# Patient Record
Sex: MALE | Race: WHITE | NOT HISPANIC OR LATINO
[De-identification: names, ages, dates, MRNs, and addresses within clinical notes are randomized per-mention and may not be internally consistent; named-entity substitution may affect disease eponyms.]

---

## 2018-04-06 ENCOUNTER — LABORATORY RESULT (OUTPATIENT)
Age: 53
End: 2018-04-06

## 2018-04-06 ENCOUNTER — TRANSCRIPTION ENCOUNTER (OUTPATIENT)
Age: 53
End: 2018-04-06

## 2018-04-06 ENCOUNTER — APPOINTMENT (OUTPATIENT)
Dept: INFECTIOUS DISEASE | Facility: CLINIC | Age: 53
End: 2018-04-06
Payer: COMMERCIAL

## 2018-04-06 VITALS
SYSTOLIC BLOOD PRESSURE: 171 MMHG | BODY MASS INDEX: 36.82 KG/M2 | TEMPERATURE: 99.3 F | WEIGHT: 263 LBS | HEART RATE: 114 BPM | DIASTOLIC BLOOD PRESSURE: 101 MMHG | HEIGHT: 71 IN | OXYGEN SATURATION: 97 %

## 2018-04-06 PROCEDURE — 99214 OFFICE O/P EST MOD 30 MIN: CPT

## 2018-04-10 LAB
ALBUMIN SERPL ELPH-MCNC: 4.3 G/DL
ALP BLD-CCNC: 54 U/L
ALT SERPL-CCNC: 44 U/L
ANION GAP SERPL CALC-SCNC: 17 MMOL/L
APPEARANCE: CLEAR
AST SERPL-CCNC: 43 U/L
BASOPHILS # BLD AUTO: 0.02 K/UL
BASOPHILS NFR BLD AUTO: 0.1 %
BILIRUB SERPL-MCNC: 0.8 MG/DL
BILIRUBIN URINE: NEGATIVE
BLOOD URINE: ABNORMAL
BUN SERPL-MCNC: 13 MG/DL
C TRACH RRNA SPEC QL NAA+PROBE: NOT DETECTED
CALCIUM SERPL-MCNC: 9.9 MG/DL
CHLORIDE SERPL-SCNC: 101 MMOL/L
CO2 SERPL-SCNC: 24 MMOL/L
COLOR: YELLOW
CREAT SERPL-MCNC: 1.26 MG/DL
CYSTATIN C SERPL-MCNC: 1.04 MG/L
EOSINOPHIL # BLD AUTO: 0.04 K/UL
EOSINOPHIL NFR BLD AUTO: 0.3 %
GFR/BSA.PRED SERPLBLD CYS-BASED-ARV: 77
GLUCOSE QUALITATIVE U: NEGATIVE MG/DL
GLUCOSE SERPL-MCNC: 88 MG/DL
HCT VFR BLD CALC: 54.2 %
HGB BLD-MCNC: 18.1 G/DL
HIV1 RNA # SERPL NAA+PROBE: NORMAL
HIV1 RNA # SERPL NAA+PROBE: NORMAL COPIES/ML
HIV1+2 AB SPEC QL IA.RAPID: NONREACTIVE
IMM GRANULOCYTES NFR BLD AUTO: 0.4 %
KETONES URINE: NEGATIVE
LEUKOCYTE ESTERASE URINE: NEGATIVE
LYMPHOCYTES # BLD AUTO: 1.85 K/UL
LYMPHOCYTES NFR BLD AUTO: 13.7 %
MAN DIFF?: NORMAL
MCHC RBC-ENTMCNC: 30.3 PG
MCHC RBC-ENTMCNC: 33.4 GM/DL
MCV RBC AUTO: 90.6 FL
MONOCYTES # BLD AUTO: 0.86 K/UL
MONOCYTES NFR BLD AUTO: 6.4 %
N GONORRHOEA RRNA SPEC QL NAA+PROBE: NOT DETECTED
NEUTROPHILS # BLD AUTO: 10.7 K/UL
NEUTROPHILS NFR BLD AUTO: 79.1 %
NITRITE URINE: NEGATIVE
PH URINE: 7.5
PLATELET # BLD AUTO: 282 K/UL
POTASSIUM SERPL-SCNC: 5 MMOL/L
PROT SERPL-MCNC: 7.7 G/DL
PROTEIN URINE: 100 MG/DL
RBC # BLD: 5.98 M/UL
RBC # FLD: 14.4 %
SODIUM SERPL-SCNC: 142 MMOL/L
SOURCE AMPLIFICATION: NORMAL
SOURCE ANAL: NORMAL
SOURCE ORAL: NORMAL
SPECIFIC GRAVITY URINE: 1.02
T PALLIDUM AB SER QL IA: NEGATIVE
UROBILINOGEN URINE: 1 MG/DL
VIRAL LOAD INTERP: NORMAL
VIRAL LOAD LOG: NORMAL LG COP/ML
WBC # FLD AUTO: 13.53 K/UL

## 2018-05-22 ENCOUNTER — APPOINTMENT (OUTPATIENT)
Dept: INFECTIOUS DISEASE | Facility: CLINIC | Age: 53
End: 2018-05-22
Payer: COMMERCIAL

## 2018-05-22 VITALS
TEMPERATURE: 97.9 F | HEART RATE: 103 BPM | SYSTOLIC BLOOD PRESSURE: 220 MMHG | OXYGEN SATURATION: 94 % | RESPIRATION RATE: 16 BRPM | BODY MASS INDEX: 36.12 KG/M2 | WEIGHT: 258 LBS | DIASTOLIC BLOOD PRESSURE: 119 MMHG | HEIGHT: 71 IN

## 2018-05-22 VITALS — SYSTOLIC BLOOD PRESSURE: 163 MMHG | DIASTOLIC BLOOD PRESSURE: 107 MMHG

## 2018-05-22 PROCEDURE — 99214 OFFICE O/P EST MOD 30 MIN: CPT

## 2018-05-23 LAB
ALBUMIN SERPL ELPH-MCNC: 4.3 G/DL
ALP BLD-CCNC: 55 U/L
ALT SERPL-CCNC: 44 U/L
ANION GAP SERPL CALC-SCNC: 12 MMOL/L
APPEARANCE: CLEAR
AST SERPL-CCNC: 42 U/L
BACTERIA: NEGATIVE
BASOPHILS # BLD AUTO: 0.03 K/UL
BASOPHILS NFR BLD AUTO: 0.3 %
BILIRUB SERPL-MCNC: 0.6 MG/DL
BILIRUBIN URINE: NEGATIVE
BLOOD URINE: ABNORMAL
BUN SERPL-MCNC: 14 MG/DL
C TRACH RRNA SPEC QL NAA+PROBE: NOT DETECTED
C TRACH RRNA SPEC QL NAA+PROBE: NOT DETECTED
CALCIUM OXALATE CRYSTALS: ABNORMAL
CALCIUM SERPL-MCNC: 9.3 MG/DL
CHLORIDE SERPL-SCNC: 101 MMOL/L
CO2 SERPL-SCNC: 27 MMOL/L
COLOR: ABNORMAL
CREAT SERPL-MCNC: 1.28 MG/DL
EOSINOPHIL # BLD AUTO: 0.11 K/UL
EOSINOPHIL NFR BLD AUTO: 1.1 %
GLUCOSE QUALITATIVE U: NEGATIVE MG/DL
GLUCOSE SERPL-MCNC: 88 MG/DL
HCT VFR BLD CALC: 56.7 %
HGB BLD-MCNC: 18.5 G/DL
HIV1+2 AB SPEC QL IA.RAPID: NONREACTIVE
HYALINE CASTS: 1 /LPF
IMM GRANULOCYTES NFR BLD AUTO: 0.5 %
KETONES URINE: NEGATIVE
LEUKOCYTE ESTERASE URINE: NEGATIVE
LYMPHOCYTES # BLD AUTO: 2.05 K/UL
LYMPHOCYTES NFR BLD AUTO: 20.5 %
MAN DIFF?: NORMAL
MCHC RBC-ENTMCNC: 30.6 PG
MCHC RBC-ENTMCNC: 32.6 GM/DL
MCV RBC AUTO: 93.9 FL
MICROSCOPIC-UA: NORMAL
MONOCYTES # BLD AUTO: 0.79 K/UL
MONOCYTES NFR BLD AUTO: 7.9 %
N GONORRHOEA RRNA SPEC QL NAA+PROBE: NOT DETECTED
N GONORRHOEA RRNA SPEC QL NAA+PROBE: NOT DETECTED
NEUTROPHILS # BLD AUTO: 6.99 K/UL
NEUTROPHILS NFR BLD AUTO: 69.7 %
NITRITE URINE: NEGATIVE
PH URINE: 7.5
PLATELET # BLD AUTO: 313 K/UL
POTASSIUM SERPL-SCNC: 4.4 MMOL/L
PROT SERPL-MCNC: 7.7 G/DL
PROTEIN URINE: 100 MG/DL
RBC # BLD: 6.04 M/UL
RBC # FLD: 15 %
RED BLOOD CELLS URINE: 80 /HPF
SODIUM SERPL-SCNC: 140 MMOL/L
SOURCE AMPLIFICATION: NORMAL
SOURCE AMPLIFICATION: NORMAL
SPECIFIC GRAVITY URINE: 1.03
SQUAMOUS EPITHELIAL CELLS: 1 /HPF
T PALLIDUM AB SER QL IA: NEGATIVE
TSH SERPL-ACNC: 1.71 UIU/ML
UROBILINOGEN URINE: 1 MG/DL
WBC # FLD AUTO: 10.02 K/UL
WHITE BLOOD CELLS URINE: 3 /HPF

## 2018-06-15 ENCOUNTER — APPOINTMENT (OUTPATIENT)
Dept: CARDIOLOGY | Facility: CLINIC | Age: 53
End: 2018-06-15
Payer: COMMERCIAL

## 2018-06-15 ENCOUNTER — NON-APPOINTMENT (OUTPATIENT)
Age: 53
End: 2018-06-15

## 2018-06-15 VITALS
WEIGHT: 247 LBS | OXYGEN SATURATION: 95 % | BODY MASS INDEX: 34.58 KG/M2 | DIASTOLIC BLOOD PRESSURE: 88 MMHG | HEART RATE: 100 BPM | SYSTOLIC BLOOD PRESSURE: 154 MMHG | HEIGHT: 71 IN

## 2018-06-15 PROCEDURE — 93000 ELECTROCARDIOGRAM COMPLETE: CPT

## 2018-06-15 PROCEDURE — 99244 OFF/OP CNSLTJ NEW/EST MOD 40: CPT

## 2018-06-15 RX ORDER — HYDROCHLOROTHIAZIDE 12.5 MG/1
12.5 CAPSULE ORAL
Qty: 30 | Refills: 0 | Status: DISCONTINUED | COMMUNITY
Start: 2018-05-22 | End: 2018-06-15

## 2018-06-18 ENCOUNTER — EMERGENCY (EMERGENCY)
Facility: HOSPITAL | Age: 53
LOS: 1 days | Discharge: HOME | End: 2018-06-18
Attending: EMERGENCY MEDICINE | Admitting: EMERGENCY MEDICINE
Payer: COMMERCIAL

## 2018-06-18 VITALS
OXYGEN SATURATION: 97 % | DIASTOLIC BLOOD PRESSURE: 91 MMHG | RESPIRATION RATE: 16 BRPM | TEMPERATURE: 99 F | HEART RATE: 86 BPM | SYSTOLIC BLOOD PRESSURE: 144 MMHG | HEIGHT: 71 IN | WEIGHT: 250 LBS

## 2018-06-18 LAB — S PYO AG SPEC QL IA: NEGATIVE — SIGNIFICANT CHANGE UP

## 2018-06-18 PROCEDURE — 99284 EMERGENCY DEPT VISIT MOD MDM: CPT

## 2018-06-18 PROCEDURE — 87081 CULTURE SCREEN ONLY: CPT

## 2018-06-18 PROCEDURE — 99283 EMERGENCY DEPT VISIT LOW MDM: CPT

## 2018-06-18 PROCEDURE — 87880 STREP A ASSAY W/OPTIC: CPT

## 2018-06-18 RX ORDER — BENZOCAINE AND MENTHOL 5; 1 G/100ML; G/100ML
1 LIQUID ORAL ONCE
Qty: 0 | Refills: 0 | Status: COMPLETED | OUTPATIENT
Start: 2018-06-18 | End: 2018-06-18

## 2018-06-18 RX ADMIN — Medication 60 MILLIGRAM(S): at 21:31

## 2018-06-18 RX ADMIN — BENZOCAINE AND MENTHOL 1 LOZENGE: 5; 1 LIQUID ORAL at 21:31

## 2018-06-18 NOTE — ED PROVIDER NOTE - ATTENDING CONTRIBUTION TO CARE
hx as per pt and PA, 5yo male with sore throat for 2 weeks, no fever, drooling or change in voice  exam :+exudate, uvula midline, no trismus  plan:strep test, po abx and steroids, ent follow up  agree with assessment and plan of PA

## 2018-06-18 NOTE — ED PROVIDER NOTE - OBJECTIVE STATEMENT
53 y male presents with throat irritation, 53 y male presents with throat irritation  x 2 weeks,  no nv, denies fever or chills,  no sick contacts, denies cough, states he has had some nasal congestion, at first used sudafed but did not like the way the medication made him feel, started to use otc saline nasal spray, that help with the nasal congestion.  states he has been gargling with hydrogen peroxide.  PMD Dr Zimmer 53 y male presents with throat irritation  x 2 weeks,  no nv, denies fever or chills,  no sick contacts, denies cough, states he has had some nasal congestion, at first used sudafed but did not like the way the medication made him feel, started to use otc saline nasal spray, that help with the nasal congestion.  states he has been gargling with hydrogen peroxide. patient states he takes truvada prophylactically, does not have hiv.    PMD Dr Zimmer

## 2018-06-18 NOTE — ED PROVIDER NOTE - PROGRESS NOTE DETAILS
rx for augmentin and prednisone sent to pharmacy, given information for Dr swan, strep test negative.  advised if condition worsens return to ed

## 2018-06-21 LAB
CULTURE RESULTS: SIGNIFICANT CHANGE UP
SPECIMEN SOURCE: SIGNIFICANT CHANGE UP

## 2018-06-26 ENCOUNTER — NON-APPOINTMENT (OUTPATIENT)
Age: 53
End: 2018-06-26

## 2018-06-26 ENCOUNTER — APPOINTMENT (OUTPATIENT)
Dept: CARDIOLOGY | Facility: CLINIC | Age: 53
End: 2018-06-26
Payer: COMMERCIAL

## 2018-06-26 VITALS
WEIGHT: 246 LBS | HEIGHT: 71 IN | DIASTOLIC BLOOD PRESSURE: 93 MMHG | BODY MASS INDEX: 34.44 KG/M2 | OXYGEN SATURATION: 95 % | SYSTOLIC BLOOD PRESSURE: 160 MMHG | HEART RATE: 87 BPM

## 2018-06-26 VITALS — DIASTOLIC BLOOD PRESSURE: 92 MMHG | SYSTOLIC BLOOD PRESSURE: 140 MMHG

## 2018-06-26 PROCEDURE — 99214 OFFICE O/P EST MOD 30 MIN: CPT

## 2018-06-26 PROCEDURE — 93000 ELECTROCARDIOGRAM COMPLETE: CPT

## 2018-07-06 ENCOUNTER — MEDICATION RENEWAL (OUTPATIENT)
Age: 53
End: 2018-07-06

## 2018-07-10 ENCOUNTER — MEDICATION RENEWAL (OUTPATIENT)
Age: 53
End: 2018-07-10

## 2018-07-10 ENCOUNTER — RX RENEWAL (OUTPATIENT)
Age: 53
End: 2018-07-10

## 2018-07-16 ENCOUNTER — APPOINTMENT (OUTPATIENT)
Dept: CARDIOLOGY | Facility: CLINIC | Age: 53
End: 2018-07-16
Payer: COMMERCIAL

## 2018-07-16 PROCEDURE — 93306 TTE W/DOPPLER COMPLETE: CPT

## 2018-07-18 PROBLEM — I10 ESSENTIAL (PRIMARY) HYPERTENSION: Chronic | Status: ACTIVE | Noted: 2018-06-18

## 2018-07-19 ENCOUNTER — APPOINTMENT (OUTPATIENT)
Dept: CARDIOLOGY | Facility: CLINIC | Age: 53
End: 2018-07-19
Payer: COMMERCIAL

## 2018-07-19 ENCOUNTER — LABORATORY RESULT (OUTPATIENT)
Age: 53
End: 2018-07-19

## 2018-07-19 VITALS
WEIGHT: 243 LBS | TEMPERATURE: 98.1 F | HEART RATE: 74 BPM | OXYGEN SATURATION: 98 % | RESPIRATION RATE: 18 BRPM | BODY MASS INDEX: 33.89 KG/M2 | SYSTOLIC BLOOD PRESSURE: 136 MMHG | DIASTOLIC BLOOD PRESSURE: 85 MMHG

## 2018-07-19 DIAGNOSIS — R06.09 OTHER FORMS OF DYSPNEA: ICD-10-CM

## 2018-07-19 PROCEDURE — 99215 OFFICE O/P EST HI 40 MIN: CPT

## 2018-07-19 PROCEDURE — 93000 ELECTROCARDIOGRAM COMPLETE: CPT

## 2018-07-20 LAB
BASOPHILS # BLD AUTO: 0.02 K/UL
BASOPHILS NFR BLD AUTO: 0.3 %
CHOLEST SERPL-MCNC: 151 MG/DL
CHOLEST/HDLC SERPL: 3.6 RATIO
EOSINOPHIL # BLD AUTO: 0.18 K/UL
EOSINOPHIL NFR BLD AUTO: 2.8 %
HCT VFR BLD CALC: 46.3 %
HDLC SERPL-MCNC: 42 MG/DL
HGB BLD-MCNC: 15.8 G/DL
IMM GRANULOCYTES NFR BLD AUTO: 0.2 %
LDLC SERPL CALC-MCNC: 84 MG/DL
LYMPHOCYTES # BLD AUTO: 1.42 K/UL
LYMPHOCYTES NFR BLD AUTO: 22 %
MAN DIFF?: NORMAL
MCHC RBC-ENTMCNC: 31 PG
MCHC RBC-ENTMCNC: 34.1 GM/DL
MCV RBC AUTO: 90.8 FL
MONOCYTES # BLD AUTO: 0.65 K/UL
MONOCYTES NFR BLD AUTO: 10.1 %
NEUTROPHILS # BLD AUTO: 4.18 K/UL
NEUTROPHILS NFR BLD AUTO: 64.6 %
PLATELET # BLD AUTO: 218 K/UL
RBC # BLD: 5.1 M/UL
RBC # FLD: 14.5 %
TRIGL SERPL-MCNC: 125 MG/DL
WBC # FLD AUTO: 6.46 K/UL

## 2018-08-09 ENCOUNTER — OUTPATIENT (OUTPATIENT)
Dept: OUTPATIENT SERVICES | Facility: HOSPITAL | Age: 53
LOS: 1 days | End: 2018-08-09
Payer: COMMERCIAL

## 2018-08-09 VITALS
OXYGEN SATURATION: 96 % | HEART RATE: 90 BPM | RESPIRATION RATE: 18 BRPM | SYSTOLIC BLOOD PRESSURE: 111 MMHG | DIASTOLIC BLOOD PRESSURE: 59 MMHG | WEIGHT: 250 LBS | HEIGHT: 71 IN | TEMPERATURE: 99 F

## 2018-08-09 DIAGNOSIS — R07.9 CHEST PAIN, UNSPECIFIED: ICD-10-CM

## 2018-08-09 LAB
ALBUMIN SERPL ELPH-MCNC: 4.5 G/DL — SIGNIFICANT CHANGE UP (ref 3.3–5)
ALP SERPL-CCNC: 61 U/L — SIGNIFICANT CHANGE UP (ref 40–120)
ALT FLD-CCNC: 65 U/L — HIGH (ref 10–45)
ANION GAP SERPL CALC-SCNC: 13 MMOL/L — SIGNIFICANT CHANGE UP (ref 5–17)
AST SERPL-CCNC: 37 U/L — SIGNIFICANT CHANGE UP (ref 10–40)
BILIRUB SERPL-MCNC: 0.8 MG/DL — SIGNIFICANT CHANGE UP (ref 0.2–1.2)
BUN SERPL-MCNC: 10 MG/DL — SIGNIFICANT CHANGE UP (ref 7–23)
CALCIUM SERPL-MCNC: 9.1 MG/DL — SIGNIFICANT CHANGE UP (ref 8.4–10.5)
CHLORIDE SERPL-SCNC: 98 MMOL/L — SIGNIFICANT CHANGE UP (ref 96–108)
CO2 SERPL-SCNC: 26 MMOL/L — SIGNIFICANT CHANGE UP (ref 22–31)
CREAT SERPL-MCNC: 1.06 MG/DL — SIGNIFICANT CHANGE UP (ref 0.5–1.3)
GLUCOSE SERPL-MCNC: 114 MG/DL — HIGH (ref 70–99)
HCT VFR BLD CALC: 44.1 % — SIGNIFICANT CHANGE UP (ref 39–50)
HGB BLD-MCNC: 15.9 G/DL — SIGNIFICANT CHANGE UP (ref 13–17)
MCHC RBC-ENTMCNC: 32.1 PG — SIGNIFICANT CHANGE UP (ref 27–34)
MCHC RBC-ENTMCNC: 36 GM/DL — SIGNIFICANT CHANGE UP (ref 32–36)
MCV RBC AUTO: 89.2 FL — SIGNIFICANT CHANGE UP (ref 80–100)
PLATELET # BLD AUTO: 216 K/UL — SIGNIFICANT CHANGE UP (ref 150–400)
POTASSIUM SERPL-MCNC: 3.9 MMOL/L — SIGNIFICANT CHANGE UP (ref 3.5–5.3)
POTASSIUM SERPL-SCNC: 3.9 MMOL/L — SIGNIFICANT CHANGE UP (ref 3.5–5.3)
PROT SERPL-MCNC: 7.4 G/DL — SIGNIFICANT CHANGE UP (ref 6–8.3)
RBC # BLD: 4.95 M/UL — SIGNIFICANT CHANGE UP (ref 4.2–5.8)
RBC # FLD: 14.1 % — SIGNIFICANT CHANGE UP (ref 10.3–14.5)
SODIUM SERPL-SCNC: 137 MMOL/L — SIGNIFICANT CHANGE UP (ref 135–145)
TROPONIN T, HIGH SENSITIVITY RESULT: 12 NG/L — SIGNIFICANT CHANGE UP (ref 0–51)
WBC # BLD: 6.2 K/UL — SIGNIFICANT CHANGE UP (ref 3.8–10.5)
WBC # FLD AUTO: 6.2 K/UL — SIGNIFICANT CHANGE UP (ref 3.8–10.5)

## 2018-08-09 PROCEDURE — 93010 ELECTROCARDIOGRAM REPORT: CPT

## 2018-08-09 PROCEDURE — 85027 COMPLETE CBC AUTOMATED: CPT

## 2018-08-09 PROCEDURE — C1769: CPT

## 2018-08-09 PROCEDURE — 93005 ELECTROCARDIOGRAM TRACING: CPT

## 2018-08-09 PROCEDURE — 99152 MOD SED SAME PHYS/QHP 5/>YRS: CPT

## 2018-08-09 PROCEDURE — 93458 L HRT ARTERY/VENTRICLE ANGIO: CPT | Mod: 26

## 2018-08-09 PROCEDURE — 80053 COMPREHEN METABOLIC PANEL: CPT

## 2018-08-09 PROCEDURE — 84484 ASSAY OF TROPONIN QUANT: CPT

## 2018-08-09 PROCEDURE — C1887: CPT

## 2018-08-09 PROCEDURE — 93458 L HRT ARTERY/VENTRICLE ANGIO: CPT

## 2018-08-09 PROCEDURE — C1894: CPT

## 2018-08-09 RX ORDER — AMLODIPINE BESYLATE 2.5 MG/1
1 TABLET ORAL
Qty: 0 | Refills: 0 | COMMUNITY

## 2018-08-09 RX ORDER — ATORVASTATIN CALCIUM 80 MG/1
1 TABLET, FILM COATED ORAL
Qty: 0 | Refills: 0 | COMMUNITY

## 2018-08-09 RX ORDER — EMTRICITABINE AND TENOFOVIR DISOPROXIL FUMARATE 200; 300 MG/1; MG/1
1 TABLET, FILM COATED ORAL
Qty: 0 | Refills: 0 | COMMUNITY

## 2018-08-09 RX ORDER — ASPIRIN/CALCIUM CARB/MAGNESIUM 324 MG
1 TABLET ORAL
Qty: 0 | Refills: 0 | COMMUNITY

## 2018-08-09 NOTE — H&P CARDIOLOGY - HISTORY OF PRESENT ILLNESS
This is a 53 year old man with PMHx of HTN, on Truvada for prophylaxis, as he is bisexual. He has been using steroids and human growth hormone for two years, but has since stopped. He at times gets short of breath with exertion. Pt underwent echocardiogram, and this showed moderate segmental LV dysfunction with an EF of 40% and inferior, apical, and lateral hypokinesis. Pt denies chest pain, dizziness, PND, orthopnea, LE swelling, or syncope. This is a 53 year old man with PMHx of HTN, on Truvada for prophylaxis, as he is bisexual. He has been using steroids and human growth hormone for two years, but has since stopped. He at times gets short of breath with exertion. Pt underwent echocardiogram in 7/2018 and this showed moderate segmental LV dysfunction with an EF of 40% and inferior, apical, and lateral hypokinesis. Pt denies chest pain, dizziness, PND, orthopnea, LE swelling, or syncope. This is a 53 year old man with PMHx of HTN, on Truvada for prophylaxis, as he is bisexual. He has been using steroids and human growth hormone for two years, but has since stopped. He at times gets short of breath with exertion. Pt underwent echocardiogram in 7/2018 and this showed moderate segmental LV dysfunction with an EF of 40% and inferior, apical, and lateral hypokinesis. Pt denies chest pain, dizziness, PND, orthopnea, LE swelling, or syncope.   Pt was referred by Dr Redmond for Cardiac Cath.

## 2018-08-16 ENCOUNTER — APPOINTMENT (OUTPATIENT)
Dept: CARDIOLOGY | Facility: CLINIC | Age: 53
End: 2018-08-16
Payer: COMMERCIAL

## 2018-08-16 PROCEDURE — 93224 XTRNL ECG REC UP TO 48 HRS: CPT

## 2018-08-21 ENCOUNTER — NON-APPOINTMENT (OUTPATIENT)
Age: 53
End: 2018-08-21

## 2018-08-21 ENCOUNTER — APPOINTMENT (OUTPATIENT)
Dept: CARDIOLOGY | Facility: CLINIC | Age: 53
End: 2018-08-21
Payer: COMMERCIAL

## 2018-08-21 ENCOUNTER — MED ADMIN CHARGE (OUTPATIENT)
Age: 53
End: 2018-08-21

## 2018-08-21 VITALS
DIASTOLIC BLOOD PRESSURE: 80 MMHG | BODY MASS INDEX: 35.7 KG/M2 | OXYGEN SATURATION: 95 % | HEIGHT: 71 IN | WEIGHT: 255 LBS | HEART RATE: 78 BPM | SYSTOLIC BLOOD PRESSURE: 159 MMHG

## 2018-08-21 PROCEDURE — 93000 ELECTROCARDIOGRAM COMPLETE: CPT

## 2018-08-21 PROCEDURE — 99214 OFFICE O/P EST MOD 30 MIN: CPT

## 2018-08-21 RX ORDER — HYDROCHLOROTHIAZIDE 25 MG/1
25 TABLET ORAL DAILY
Qty: 30 | Refills: 1 | Status: DISCONTINUED | COMMUNITY
Start: 2018-06-15 | End: 2018-08-21

## 2018-08-21 RX ORDER — ASPIRIN 81 MG/1
81 TABLET ORAL
Qty: 30 | Refills: 3 | Status: DISCONTINUED | COMMUNITY
Start: 2018-06-15 | End: 2018-08-21

## 2018-08-21 RX ORDER — ATORVASTATIN CALCIUM 20 MG/1
20 TABLET, FILM COATED ORAL
Qty: 30 | Refills: 1 | Status: DISCONTINUED | COMMUNITY
Start: 2018-07-19 | End: 2018-08-21

## 2018-09-05 ENCOUNTER — APPOINTMENT (OUTPATIENT)
Dept: CARDIOLOGY | Facility: CLINIC | Age: 53
End: 2018-09-05
Payer: COMMERCIAL

## 2018-09-05 VITALS
OXYGEN SATURATION: 99 % | SYSTOLIC BLOOD PRESSURE: 161 MMHG | BODY MASS INDEX: 35.14 KG/M2 | HEIGHT: 71 IN | HEART RATE: 90 BPM | DIASTOLIC BLOOD PRESSURE: 89 MMHG | WEIGHT: 251 LBS

## 2018-09-05 VITALS — DIASTOLIC BLOOD PRESSURE: 80 MMHG | SYSTOLIC BLOOD PRESSURE: 130 MMHG

## 2018-09-05 PROCEDURE — 99215 OFFICE O/P EST HI 40 MIN: CPT

## 2018-09-12 ENCOUNTER — RX RENEWAL (OUTPATIENT)
Age: 53
End: 2018-09-12

## 2018-10-01 ENCOUNTER — RX RENEWAL (OUTPATIENT)
Age: 53
End: 2018-10-01

## 2018-10-09 ENCOUNTER — APPOINTMENT (OUTPATIENT)
Dept: CARDIOLOGY | Facility: CLINIC | Age: 53
End: 2018-10-09
Payer: COMMERCIAL

## 2018-10-09 VITALS
OXYGEN SATURATION: 96 % | DIASTOLIC BLOOD PRESSURE: 79 MMHG | WEIGHT: 259 LBS | HEIGHT: 71 IN | BODY MASS INDEX: 36.26 KG/M2 | HEART RATE: 75 BPM | SYSTOLIC BLOOD PRESSURE: 131 MMHG

## 2018-10-09 PROCEDURE — 99214 OFFICE O/P EST MOD 30 MIN: CPT

## 2018-10-09 RX ORDER — METOPROLOL SUCCINATE 25 MG/1
25 TABLET, EXTENDED RELEASE ORAL
Qty: 90 | Refills: 3 | Status: DISCONTINUED | COMMUNITY
Start: 2018-09-12 | End: 2018-10-09

## 2018-10-09 RX ORDER — AMLODIPINE BESYLATE 10 MG/1
10 TABLET ORAL DAILY
Qty: 90 | Refills: 3 | Status: DISCONTINUED | COMMUNITY
Start: 2018-06-15 | End: 2018-10-09

## 2018-11-15 ENCOUNTER — RX RENEWAL (OUTPATIENT)
Age: 53
End: 2018-11-15

## 2018-11-16 ENCOUNTER — RX RENEWAL (OUTPATIENT)
Age: 53
End: 2018-11-16

## 2018-11-20 ENCOUNTER — NON-APPOINTMENT (OUTPATIENT)
Age: 53
End: 2018-11-20

## 2018-11-20 ENCOUNTER — APPOINTMENT (OUTPATIENT)
Dept: CARDIOLOGY | Facility: CLINIC | Age: 53
End: 2018-11-20
Payer: COMMERCIAL

## 2018-11-20 VITALS
SYSTOLIC BLOOD PRESSURE: 159 MMHG | HEART RATE: 71 BPM | BODY MASS INDEX: 35.56 KG/M2 | DIASTOLIC BLOOD PRESSURE: 83 MMHG | WEIGHT: 254 LBS | OXYGEN SATURATION: 95 % | HEIGHT: 71 IN

## 2018-11-20 PROCEDURE — 99214 OFFICE O/P EST MOD 30 MIN: CPT

## 2018-11-20 PROCEDURE — 93000 ELECTROCARDIOGRAM COMPLETE: CPT

## 2018-11-21 ENCOUNTER — APPOINTMENT (OUTPATIENT)
Dept: INFECTIOUS DISEASE | Facility: CLINIC | Age: 53
End: 2018-11-21

## 2018-11-27 ENCOUNTER — APPOINTMENT (OUTPATIENT)
Dept: INFECTIOUS DISEASE | Facility: CLINIC | Age: 53
End: 2018-11-27
Payer: COMMERCIAL

## 2018-11-27 VITALS
BODY MASS INDEX: 35.56 KG/M2 | OXYGEN SATURATION: 97 % | HEART RATE: 62 BPM | TEMPERATURE: 97.4 F | SYSTOLIC BLOOD PRESSURE: 151 MMHG | DIASTOLIC BLOOD PRESSURE: 76 MMHG | HEIGHT: 71 IN | WEIGHT: 254 LBS

## 2018-11-27 PROCEDURE — 99214 OFFICE O/P EST MOD 30 MIN: CPT

## 2018-11-27 RX ORDER — EMTRICITABINE AND TENOFOVIR DISOPROXIL FUMARATE 200; 300 MG/1; MG/1
200-300 TABLET, FILM COATED ORAL
Qty: 30 | Refills: 2 | Status: COMPLETED | COMMUNITY
Start: 2018-04-06 | End: 2018-11-27

## 2018-11-28 LAB
25(OH)D3 SERPL-MCNC: 44.8 NG/ML
APPEARANCE: CLEAR
BACTERIA: NEGATIVE
BILIRUBIN URINE: NEGATIVE
BLOOD URINE: NEGATIVE
C TRACH RRNA SPEC QL NAA+PROBE: NOT DETECTED
CD3 CELLS # BLD: 1109 /UL
CD3 CELLS NFR BLD: 69 %
CD3+CD4+ CELLS # BLD: 683 /UL
CD3+CD4+ CELLS NFR BLD: 42 %
CD3+CD4+ CELLS/CD3+CD8+ CLL SPEC: 2.1 RATIO
CD3+CD8+ CELLS # SPEC: 324 /UL
CD3+CD8+ CELLS NFR BLD: 20 %
CHOLEST SERPL-MCNC: 143 MG/DL
CHOLEST/HDLC SERPL: 4.9 RATIO
COLOR: YELLOW
GLUCOSE QUALITATIVE U: NEGATIVE MG/DL
HBA1C MFR BLD HPLC: 5.7 %
HDLC SERPL-MCNC: 29 MG/DL
HIV1 RNA # SERPL NAA+PROBE: NORMAL
HIV1 RNA # SERPL NAA+PROBE: NORMAL COPIES/ML
HIV1+2 AB SPEC QL IA.RAPID: NONREACTIVE
HYALINE CASTS: 0 /LPF
KETONES URINE: NEGATIVE
LDLC SERPL CALC-MCNC: 82 MG/DL
LEUKOCYTE ESTERASE URINE: NEGATIVE
MICROSCOPIC-UA: NORMAL
N GONORRHOEA RRNA SPEC QL NAA+PROBE: NOT DETECTED
NITRITE URINE: NEGATIVE
PH URINE: 6.5
PROTEIN URINE: 30 MG/DL
RED BLOOD CELLS URINE: 6 /HPF
SOURCE AMPLIFICATION: NORMAL
SOURCE ANAL: NORMAL
SOURCE ORAL: NORMAL
SPECIFIC GRAVITY URINE: 1.03
SQUAMOUS EPITHELIAL CELLS: 0 /HPF
T PALLIDUM AB SER QL IA: NEGATIVE
TRIGL SERPL-MCNC: 162 MG/DL
UROBILINOGEN URINE: NEGATIVE MG/DL
VIRAL LOAD INTERP: NORMAL
VIRAL LOAD LOG: NORMAL LG COP/ML
WHITE BLOOD CELLS URINE: 2 /HPF

## 2018-11-30 LAB
M TB IFN-G BLD-IMP: NEGATIVE
QUANTIFERON TB PLUS MITOGEN MINUS NIL: 6.91 IU/ML
QUANTIFERON TB PLUS NIL: 0.06 IU/ML
QUANTIFERON TB PLUS TB1 MINUS NIL: 0 IU/ML
QUANTIFERON TB PLUS TB2 MINUS NIL: -0.01 IU/ML

## 2018-12-13 ENCOUNTER — RX RENEWAL (OUTPATIENT)
Age: 53
End: 2018-12-13

## 2018-12-28 ENCOUNTER — APPOINTMENT (OUTPATIENT)
Dept: CARDIOLOGY | Facility: CLINIC | Age: 53
End: 2018-12-28
Payer: COMMERCIAL

## 2018-12-28 PROCEDURE — 93306 TTE W/DOPPLER COMPLETE: CPT

## 2019-02-28 ENCOUNTER — TRANSCRIPTION ENCOUNTER (OUTPATIENT)
Age: 54
End: 2019-02-28

## 2019-03-13 ENCOUNTER — RX RENEWAL (OUTPATIENT)
Age: 54
End: 2019-03-13

## 2019-03-20 ENCOUNTER — APPOINTMENT (OUTPATIENT)
Dept: INFECTIOUS DISEASE | Facility: CLINIC | Age: 54
End: 2019-03-20
Payer: COMMERCIAL

## 2019-03-20 VITALS
DIASTOLIC BLOOD PRESSURE: 81 MMHG | SYSTOLIC BLOOD PRESSURE: 158 MMHG | TEMPERATURE: 96.9 F | WEIGHT: 262 LBS | BODY MASS INDEX: 36.54 KG/M2 | OXYGEN SATURATION: 94 % | HEART RATE: 81 BPM

## 2019-03-20 LAB
APPEARANCE: CLEAR
BACTERIA: NEGATIVE
BILIRUBIN URINE: NEGATIVE
BLOOD URINE: ABNORMAL
COLOR: YELLOW
GLUCOSE QUALITATIVE U: NEGATIVE
HYALINE CASTS: 0 /LPF
KETONES URINE: NEGATIVE
LEUKOCYTE ESTERASE URINE: NEGATIVE
MICROSCOPIC-UA: NORMAL
NITRITE URINE: NEGATIVE
PH URINE: 6.5
PROTEIN URINE: ABNORMAL
RED BLOOD CELLS URINE: 10 /HPF
SPECIFIC GRAVITY URINE: 1.03
SQUAMOUS EPITHELIAL CELLS: 1 /HPF
UROBILINOGEN URINE: NORMAL
WHITE BLOOD CELLS URINE: 1 /HPF

## 2019-03-20 PROCEDURE — 99214 OFFICE O/P EST MOD 30 MIN: CPT

## 2019-03-20 NOTE — ASSESSMENT
[FreeTextEntry1] : 3/20/2019-----KAVIN HANKINS is a 53 year old male being seen for a follow-up visit. Pt doing great with Blood Pressure. Continue Truvada for PrEP. see in 3 months. Seeing cardiology. doing great. labs today.  [Treatment Education] : treatment education [Treatment Adherence] : treatment adherence [Drug Interactions / Side Effects] : drug interactions/side effects [Anticipatory Guidance] : anticipatory guidance

## 2019-03-20 NOTE — HISTORY OF PRESENT ILLNESS
[FreeTextEntry1] : History of Present Illness\par Reason For Visit\par 3/20/2019-----KAVIN HANKINS is a 53 year old male being seen for a follow-up visit. Pt doing great with Blood Pressure. Continue Truvada for PrEP. see in 3 months. Seeing cardiology. doing great. labs today. \par  \par History of Present Illness\par Reason For Visit\par KAVIN HANKINS is a 53 year old male being seen for a 2nd evaluation. Concern over blood presure 120/119. pt states taking growth hormone and other steroids for body building. Pt needs a primarycare provider. Pt states no headaches or blurred vision or body weakness. Plan . Pt needs to cut down or stop steroids !!!! needs blood pressure cuff and needs a PCP!! start low dose HCTZ 12.5 today and see in a week. labs today. EKG today See [facility] today. pt also told of Stroke or MI from steroid use/abuse. If chest pain /headache to go to emergency room. \par  \par History of Present Illness\par 52 year old male interested in PreP . Bisexual. dosesnt smoke. med Hx: none surgury: none Allergies : None. currently taking for sHGH-somatropin, trenbolone acetate, testosterone propionate and milt thistle. . plan all labs today. hold off on truvada until labs reurn. see in one month. \par  \par Review of Systems\par \par Constitutional, Eyes, ENT, Cardiovascular, Respiratory, Gastrointestinal, Genitourinary, Musculoskeletal, Integumentary, Neurological, Psychiatric and Heme/Lymph are otherwise negative. \par  \par Vitals\par Vital Signs \par 	Recorded: 06Apr2018 10:51AM\par Systolic	171, LUE, Sitting\par Diastolic	101, LUE, Sitting\par Pain Scale	0\par Height	5 ft 11 in\par Weight	263 lb \par BMI Calculated	36.68\par BSA Calculated	2.37\par Temperature	99.3 F, Oral\par Heart Rate	114, L Radial\par O2 Saturation	97\par \par Assessment\par Exposure to communicable disease (V01.9) (Z20.9)\par \par 52 year old male interested in PreP. Bisexual. dosesnt smoke. med Hx: none surgury: none Allergies : None. currently taking for sHGH-somatropin, trenbolone acetate, testosterone propionate and milt thistle.. plan all labs today. hold off on truvada until labs reurn. see in one month. \par \par  \par Plan\par 1) 52 year old male interested in PreP . Bisexual. dosesnt smoke. med Hx: none surgury: none Allergies : None. currently taking for sHGH-somatropin, trenbolone acetate, testosterone propionate and milt thistle . plan all labs today. hold off on truvada until labs return see in one month. see [facility] today. \par \par \par \par  \par Active Problems\par Cardiomyopathy (425.4) (I42.9)\par Dyspnea on exertion (786.09) (R06.09)\par Exposure to communicable disease (V01.9) (Z20.9)\par HTN (hypertension) (401.9) (I10)\par \par Current Meds\par Metoprolol Succinate  MG Oral Tablet Extended Release 24 Hour; TAKE 1 TABLET\par ONCE DAILY\par Olmesartan Medoxomil 20 MG Oral Tablet; TAKE 1 TABLET BY MOUTH EVERY DAY\par Truvada 200-300 MG Oral Tablet; TAKE 1 TABLET BY MOUTH DAILY\par \par Allergies\par No Known Drug Allergies\par

## 2019-03-21 ENCOUNTER — RX RENEWAL (OUTPATIENT)
Age: 54
End: 2019-03-21

## 2019-03-21 LAB
25(OH)D3 SERPL-MCNC: 28.9 NG/ML
ALBUMIN SERPL ELPH-MCNC: 4.4 G/DL
ALP BLD-CCNC: 75 U/L
ALT SERPL-CCNC: 51 U/L
ANION GAP SERPL CALC-SCNC: 10 MMOL/L
AST SERPL-CCNC: 37 U/L
BASOPHILS # BLD AUTO: 0.04 K/UL
BASOPHILS NFR BLD AUTO: 0.5 %
BILIRUB SERPL-MCNC: 0.5 MG/DL
BUN SERPL-MCNC: 16 MG/DL
C TRACH RRNA SPEC QL NAA+PROBE: NOT DETECTED
CALCIUM SERPL-MCNC: 9.4 MG/DL
CHLORIDE SERPL-SCNC: 103 MMOL/L
CO2 SERPL-SCNC: 28 MMOL/L
CREAT SERPL-MCNC: 1.25 MG/DL
EOSINOPHIL # BLD AUTO: 0.18 K/UL
EOSINOPHIL NFR BLD AUTO: 2.1 %
GLUCOSE SERPL-MCNC: 148 MG/DL
HCT VFR BLD CALC: 49.5 %
HGB BLD-MCNC: 14.7 G/DL
HIV1 RNA # SERPL NAA+PROBE: NORMAL
HIV1 RNA # SERPL NAA+PROBE: NORMAL COPIES/ML
HIV1+2 AB SPEC QL IA.RAPID: NONREACTIVE
IMM GRANULOCYTES NFR BLD AUTO: 0.1 %
LYMPHOCYTES # BLD AUTO: 1.38 K/UL
LYMPHOCYTES NFR BLD AUTO: 16.3 %
MAN DIFF?: NORMAL
MCHC RBC-ENTMCNC: 26.8 PG
MCHC RBC-ENTMCNC: 29.7 GM/DL
MCV RBC AUTO: 90.3 FL
MONOCYTES # BLD AUTO: 0.8 K/UL
MONOCYTES NFR BLD AUTO: 9.5 %
N GONORRHOEA RRNA SPEC QL NAA+PROBE: NOT DETECTED
NEUTROPHILS # BLD AUTO: 6.05 K/UL
NEUTROPHILS NFR BLD AUTO: 71.5 %
PLATELET # BLD AUTO: 267 K/UL
POTASSIUM SERPL-SCNC: 4.5 MMOL/L
PROT SERPL-MCNC: 7.1 G/DL
RBC # BLD: 5.48 M/UL
RBC # FLD: 13.6 %
SODIUM SERPL-SCNC: 141 MMOL/L
SOURCE AMPLIFICATION: NORMAL
SOURCE ANAL: NORMAL
SOURCE ORAL: NORMAL
T PALLIDUM AB SER QL IA: NEGATIVE
VIRAL LOAD INTERP: NORMAL
VIRAL LOAD LOG: NORMAL LG COP/ML
WBC # FLD AUTO: 8.46 K/UL

## 2019-03-29 ENCOUNTER — RESULT REVIEW (OUTPATIENT)
Age: 54
End: 2019-03-29

## 2019-04-03 ENCOUNTER — RX CHANGE (OUTPATIENT)
Age: 54
End: 2019-04-03

## 2019-04-04 RX ORDER — OLMESARTAN MEDOXOMIL 20 MG/1
20 TABLET, FILM COATED ORAL DAILY
Qty: 90 | Refills: 3 | Status: DISCONTINUED | COMMUNITY
Start: 2018-09-05 | End: 2019-04-04

## 2019-04-05 ENCOUNTER — NON-APPOINTMENT (OUTPATIENT)
Age: 54
End: 2019-04-05

## 2019-04-05 ENCOUNTER — APPOINTMENT (OUTPATIENT)
Dept: CARDIOLOGY | Facility: CLINIC | Age: 54
End: 2019-04-05
Payer: COMMERCIAL

## 2019-04-05 VITALS
SYSTOLIC BLOOD PRESSURE: 181 MMHG | HEART RATE: 55 BPM | DIASTOLIC BLOOD PRESSURE: 84 MMHG | WEIGHT: 266 LBS | BODY MASS INDEX: 37.24 KG/M2 | OXYGEN SATURATION: 98 % | HEIGHT: 71 IN

## 2019-04-05 VITALS — SYSTOLIC BLOOD PRESSURE: 140 MMHG | DIASTOLIC BLOOD PRESSURE: 88 MMHG

## 2019-04-05 PROCEDURE — 93000 ELECTROCARDIOGRAM COMPLETE: CPT

## 2019-04-05 PROCEDURE — 99214 OFFICE O/P EST MOD 30 MIN: CPT

## 2019-06-21 ENCOUNTER — APPOINTMENT (OUTPATIENT)
Dept: INFECTIOUS DISEASE | Facility: CLINIC | Age: 54
End: 2019-06-21
Payer: COMMERCIAL

## 2019-06-21 VITALS
WEIGHT: 266 LBS | SYSTOLIC BLOOD PRESSURE: 156 MMHG | HEART RATE: 66 BPM | DIASTOLIC BLOOD PRESSURE: 84 MMHG | TEMPERATURE: 98.5 F | OXYGEN SATURATION: 98 % | BODY MASS INDEX: 37.24 KG/M2 | HEIGHT: 71 IN

## 2019-06-21 LAB
BASOPHILS # BLD AUTO: 0.04 K/UL
BASOPHILS NFR BLD AUTO: 0.7 %
EOSINOPHIL # BLD AUTO: 0.11 K/UL
EOSINOPHIL NFR BLD AUTO: 2 %
HCT VFR BLD CALC: 47.6 %
HGB BLD-MCNC: 14.5 G/DL
IMM GRANULOCYTES NFR BLD AUTO: 0.2 %
LYMPHOCYTES # BLD AUTO: 1.42 K/UL
LYMPHOCYTES NFR BLD AUTO: 26.2 %
MAN DIFF?: NORMAL
MCHC RBC-ENTMCNC: 25.2 PG
MCHC RBC-ENTMCNC: 30.5 GM/DL
MCV RBC AUTO: 82.6 FL
MONOCYTES # BLD AUTO: 0.72 K/UL
MONOCYTES NFR BLD AUTO: 13.3 %
NEUTROPHILS # BLD AUTO: 3.13 K/UL
NEUTROPHILS NFR BLD AUTO: 57.6 %
PLATELET # BLD AUTO: 261 K/UL
RBC # BLD: 5.76 M/UL
RBC # FLD: 15.4 %
WBC # FLD AUTO: 5.43 K/UL

## 2019-06-21 PROCEDURE — 99214 OFFICE O/P EST MOD 30 MIN: CPT

## 2019-06-21 NOTE — ASSESSMENT
[FreeTextEntry1] : 6/21/2019-----KAVIN HANKINS is a 53 year old male being seen for a follow-up visit. Pt doing great with Blood Pressure. Continue Truvada for PrEP. see in 3 months. Seeing cardiology. doing great. labs today.  [Treatment Education] : treatment education [Treatment Adherence] : treatment adherence [Drug Interactions / Side Effects] : drug interactions/side effects [HIV Education] : HIV Education [Anticipatory Guidance] : anticipatory guidance

## 2019-06-21 NOTE — HISTORY OF PRESENT ILLNESS
[FreeTextEntry1] : \par Reason For Visit\par 6/21/2019-----KAVIN HANKINS is a 53 year old male being seen for a follow-up visit. Pt doing great with Blood Pressure. Continue Truvada for PrEP. see in 3 months. Seeing cardiology. doing great. labs today. \par  \par History of Present Illness\par Reason For Visit\par KAVIN HANKINS is a 53 year old male being seen for a 2nd evaluation. Concern over blood presure 120/119. pt states taking growth hormone and other steroids for body building. Pt needs a primarycare provider. Pt states no headaches or blurred vision or body weakness. Plan . Pt needs to cut down or stop steroids !!!! needs blood pressure cuff and needs a PCP!! start low dose HCTZ 12.5 today and see in a week. labs today. EKG today See [facility] today. pt also told of Stroke or MI from steroid use/abuse. If chest pain /headache to go to emergency room. \par  \par History of Present Illness\par 52 year old male interested in PreP . Bisexual. dosesnt smoke. med Hx: none surgury: none Allergies : None. currently taking for sHGH-somatropin, trenbolone acetate, testosterone propionate and milt thistle. . plan all labs today. hold off on truvada until labs reurn. see in one month. \par  \par Review of Systems\par \par Constitutional, Eyes, ENT, Cardiovascular, Respiratory, Gastrointestinal, Genitourinary, Musculoskeletal, Integumentary, Neurological, Psychiatric and Heme/Lymph are otherwise negative. \par  \par Vitals\par Vital Signs \par 	Recorded: 06Apr2018 10:51AM\par Systolic	171, LUE, Sitting\par Diastolic	101, LUE, Sitting\par Pain Scale	0\par Height	5 ft 11 in\par Weight	263 lb \par BMI Calculated	36.68\par BSA Calculated	2.37\par Temperature	99.3 F, Oral\par Heart Rate	114, L Radial\par O2 Saturation	97\par \par Assessment\par Exposure to communicable disease (V01.9) (Z20.9)\par \par 52 year old male interested in PreP. Bisexual. dosesnt smoke. med Hx: none surgury: none Allergies : None. currently taking for sHGH-somatropin, trenbolone acetate, testosterone propionate and milt thistle.. plan all labs today. hold off on truvada until labs reurn. see in one month. \par \par  \par Plan\par 1) 52 year old male interested in PreP . Bisexual. dosesnt smoke. med Hx: none surgury: none Allergies : None. currently taking for sHGH-somatropin, trenbolone acetate, testosterone propionate and milt thistle . plan all labs today. hold off on truvada until labs return see in one month. see [facility] today. \par \par \par \par  \par Active Problems\par Cardiomyopathy (425.4) (I42.9)\par Dyspnea on exertion (786.09) (R06.09)\par Exposure to communicable disease (V01.9) (Z20.9)\par HTN (hypertension) (401.9) (I10)\par \par Current Meds\par Metoprolol Succinate  MG Oral Tablet Extended Release 24 Hour; TAKE 1 TABLET\par ONCE DAILY\par Olmesartan Medoxomil 20 MG Oral Tablet; TAKE 1 TABLET BY MOUTH EVERY DAY\par Truvada 200-300 MG Oral Tablet; TAKE 1 TABLET BY MOUTH DAILY\par \par Allergies\par No Known Drug Allergies\par \par  \par Active Problems\par Cardiomyopathy (425.4) (I42.9)\par Dyspnea on exertion (786.09) (R06.09)\par Exposure to communicable disease (V01.9) (Z20.9)\par HTN (hypertension) (401.9) (I10)\par \par Current Meds\par Metoprolol Succinate  MG Oral Tablet Extended Release 24 Hour; TAKE 1 TABLET\par ONCE DAILY\par Olmesartan Medoxomil 20 MG Oral Tablet; TAKE 1 TABLET BY MOUTH EVERY DAY\par Truvada 200-300 MG Oral Tablet; TAKE ONE TABLET BY MOUTH DAILY\par \par Allergies\par No Known Drug Allergies\par

## 2019-06-24 LAB
25(OH)D3 SERPL-MCNC: 36.9 NG/ML
ALBUMIN SERPL ELPH-MCNC: 4.9 G/DL
ALP BLD-CCNC: 81 U/L
ALT SERPL-CCNC: 63 U/L
ANION GAP SERPL CALC-SCNC: 14 MMOL/L
APPEARANCE: CLEAR
AST SERPL-CCNC: 43 U/L
BACTERIA: NEGATIVE
BILIRUB SERPL-MCNC: 0.7 MG/DL
BILIRUBIN URINE: NEGATIVE
BLOOD URINE: NORMAL
BUN SERPL-MCNC: 17 MG/DL
C TRACH RRNA SPEC QL NAA+PROBE: NOT DETECTED
CALCIUM SERPL-MCNC: 9.5 MG/DL
CHLORIDE SERPL-SCNC: 100 MMOL/L
CO2 SERPL-SCNC: 28 MMOL/L
COLOR: YELLOW
CREAT SERPL-MCNC: 1.15 MG/DL
CYSTATIN C SERPL-MCNC: 0.99 MG/L
ESTIMATED AVERAGE GLUCOSE: 128 MG/DL
GFR/BSA.PRED SERPLBLD CYS-BASED-ARV: 81 ML/MIN
GLUCOSE QUALITATIVE U: NEGATIVE
GLUCOSE SERPL-MCNC: 116 MG/DL
HBA1C MFR BLD HPLC: 6.1 %
HIV1+2 AB SPEC QL IA.RAPID: NONREACTIVE
HYALINE CASTS: 1 /LPF
KETONES URINE: NEGATIVE
LEUKOCYTE ESTERASE URINE: NEGATIVE
MICROSCOPIC-UA: NORMAL
N GONORRHOEA RRNA SPEC QL NAA+PROBE: NOT DETECTED
NITRITE URINE: NEGATIVE
PH URINE: 6.5
POTASSIUM SERPL-SCNC: 5.2 MMOL/L
PROT SERPL-MCNC: 7.3 G/DL
PROTEIN URINE: NORMAL
RED BLOOD CELLS URINE: 3 /HPF
SODIUM SERPL-SCNC: 142 MMOL/L
SOURCE AMPLIFICATION: NORMAL
SOURCE ANAL: NORMAL
SOURCE ORAL: NORMAL
SPECIFIC GRAVITY URINE: 1.02
SQUAMOUS EPITHELIAL CELLS: 1 /HPF
T PALLIDUM AB SER QL IA: NEGATIVE
TSH SERPL-ACNC: 2.55 UIU/ML
UROBILINOGEN URINE: NORMAL
WHITE BLOOD CELLS URINE: 1 /HPF

## 2019-08-12 ENCOUNTER — RX RENEWAL (OUTPATIENT)
Age: 54
End: 2019-08-12

## 2019-09-18 ENCOUNTER — NON-APPOINTMENT (OUTPATIENT)
Age: 54
End: 2019-09-18

## 2019-09-18 ENCOUNTER — APPOINTMENT (OUTPATIENT)
Dept: CARDIOLOGY | Facility: CLINIC | Age: 54
End: 2019-09-18
Payer: COMMERCIAL

## 2019-09-18 VITALS — SYSTOLIC BLOOD PRESSURE: 130 MMHG | DIASTOLIC BLOOD PRESSURE: 80 MMHG

## 2019-09-18 VITALS
SYSTOLIC BLOOD PRESSURE: 144 MMHG | HEART RATE: 66 BPM | DIASTOLIC BLOOD PRESSURE: 74 MMHG | BODY MASS INDEX: 37.24 KG/M2 | HEIGHT: 71 IN | WEIGHT: 266 LBS | OXYGEN SATURATION: 98 %

## 2019-09-18 DIAGNOSIS — F41.9 ANXIETY DISORDER, UNSPECIFIED: ICD-10-CM

## 2019-09-18 PROCEDURE — 99214 OFFICE O/P EST MOD 30 MIN: CPT

## 2019-09-18 PROCEDURE — 93000 ELECTROCARDIOGRAM COMPLETE: CPT

## 2019-09-18 RX ORDER — ESCITALOPRAM OXALATE 10 MG/1
10 TABLET ORAL DAILY
Qty: 30 | Refills: 1 | Status: ACTIVE | COMMUNITY
Start: 2019-09-18 | End: 1900-01-01

## 2019-09-27 ENCOUNTER — APPOINTMENT (OUTPATIENT)
Dept: INFECTIOUS DISEASE | Facility: CLINIC | Age: 54
End: 2019-09-27
Payer: COMMERCIAL

## 2019-09-27 VITALS
TEMPERATURE: 99.2 F | SYSTOLIC BLOOD PRESSURE: 139 MMHG | HEART RATE: 63 BPM | BODY MASS INDEX: 36.96 KG/M2 | OXYGEN SATURATION: 97 % | RESPIRATION RATE: 16 BRPM | HEIGHT: 71 IN | WEIGHT: 264 LBS | DIASTOLIC BLOOD PRESSURE: 71 MMHG

## 2019-09-27 LAB
ALBUMIN SERPL ELPH-MCNC: 4.7 G/DL
ALP BLD-CCNC: 77 U/L
ALT SERPL-CCNC: 46 U/L
ANION GAP SERPL CALC-SCNC: 10 MMOL/L
AST SERPL-CCNC: 28 U/L
BASOPHILS # BLD AUTO: 0.04 K/UL
BASOPHILS NFR BLD AUTO: 0.7 %
BILIRUB SERPL-MCNC: 0.6 MG/DL
BUN SERPL-MCNC: 16 MG/DL
CALCIUM SERPL-MCNC: 8.9 MG/DL
CHLORIDE SERPL-SCNC: 103 MMOL/L
CO2 SERPL-SCNC: 28 MMOL/L
CREAT SERPL-MCNC: 1.16 MG/DL
CYSTATIN C SERPL-MCNC: 0.88 MG/L
EOSINOPHIL # BLD AUTO: 0.09 K/UL
EOSINOPHIL NFR BLD AUTO: 1.6 %
GFR/BSA.PRED SERPLBLD CYS-BASED-ARV: 95 ML/MIN
GLUCOSE SERPL-MCNC: 113 MG/DL
HCT VFR BLD CALC: 45.5 %
HGB BLD-MCNC: 13.2 G/DL
IMM GRANULOCYTES NFR BLD AUTO: 0.2 %
LYMPHOCYTES # BLD AUTO: 1.3 K/UL
LYMPHOCYTES NFR BLD AUTO: 23 %
MAN DIFF?: NORMAL
MCHC RBC-ENTMCNC: 24.1 PG
MCHC RBC-ENTMCNC: 29 GM/DL
MCV RBC AUTO: 83.2 FL
MONOCYTES # BLD AUTO: 0.67 K/UL
MONOCYTES NFR BLD AUTO: 11.9 %
NEUTROPHILS # BLD AUTO: 3.54 K/UL
NEUTROPHILS NFR BLD AUTO: 62.6 %
PLATELET # BLD AUTO: 253 K/UL
POTASSIUM SERPL-SCNC: 4.4 MMOL/L
PROT SERPL-MCNC: 7 G/DL
RBC # BLD: 5.47 M/UL
RBC # FLD: 15.1 %
SODIUM SERPL-SCNC: 141 MMOL/L
WBC # FLD AUTO: 5.65 K/UL

## 2019-09-27 PROCEDURE — 99214 OFFICE O/P EST MOD 30 MIN: CPT

## 2019-09-30 LAB
25(OH)D3 SERPL-MCNC: 51.9 NG/ML
APPEARANCE: CLEAR
BACTERIA: NEGATIVE
BILIRUBIN URINE: NEGATIVE
BLOOD URINE: NEGATIVE
C TRACH RRNA SPEC QL NAA+PROBE: NOT DETECTED
COLOR: YELLOW
GLUCOSE QUALITATIVE U: NEGATIVE
HBV SURFACE AG SER QL: NONREACTIVE
HCV AB SER QL: NONREACTIVE
HCV S/CO RATIO: 0.1 S/CO
HIV1 RNA # SERPL NAA+PROBE: NORMAL
HIV1 RNA # SERPL NAA+PROBE: NORMAL COPIES/ML
HIV1+2 AB SPEC QL IA.RAPID: NONREACTIVE
HYALINE CASTS: 4 /LPF
KETONES URINE: NEGATIVE
LEUKOCYTE ESTERASE URINE: NEGATIVE
MICROSCOPIC-UA: NORMAL
N GONORRHOEA RRNA SPEC QL NAA+PROBE: NOT DETECTED
NITRITE URINE: NEGATIVE
PH URINE: 7
PROTEIN URINE: ABNORMAL
RED BLOOD CELLS URINE: 2 /HPF
SOURCE AMPLIFICATION: NORMAL
SOURCE ANAL: NORMAL
SOURCE ORAL: NORMAL
SPECIFIC GRAVITY URINE: 1.02
SQUAMOUS EPITHELIAL CELLS: 1 /HPF
T PALLIDUM AB SER QL IA: NEGATIVE
UROBILINOGEN URINE: NORMAL
VIRAL LOAD INTERP: NORMAL
VIRAL LOAD LOG: NORMAL LG COP/ML
WHITE BLOOD CELLS URINE: 5 /HPF

## 2019-09-30 NOTE — ASSESSMENT
[FreeTextEntry1] : 9/27//2019-----KAVIN HANKINS is a 53 year old male being seen for a follow-up visit. Pt doing great with Blood Pressure. Continue Truvada for PrEP. see in 3 months. Seeing cardiology. doing great. labs today.  [Treatment Education] : treatment education [Treatment Adherence] : treatment adherence [Drug Interactions / Side Effects] : drug interactions/side effects [Anticipatory Guidance] : anticipatory guidance [HIV Education] : HIV Education

## 2019-09-30 NOTE — HISTORY OF PRESENT ILLNESS
[FreeTextEntry1] : Reason For Visit\par 9/27//2019-----KAVIN HANKINS is a 53 year old male being seen for a follow-up visit. Pt doing great with Blood Pressure. Continue Truvada for PrEP. see in 3 months. Seeing cardiology. doing great. labs today. \par  \par History of Present Illness\par Reason For Visit\par KAVIN HANKINS is a 53 year old male being seen for a 2nd evaluation. Concern over blood presure 120/119. pt states taking growth hormone and other steroids for body building. Pt needs a primarycare provider. Pt states no headaches or blurred vision or body weakness. Plan . Pt needs to cut down or stop steroids !!!! needs blood pressure cuff and needs a PCP!! start low dose HCTZ 12.5 today and see in a week. labs today. EKG today See [facility] today. pt also told of Stroke or MI from steroid use/abuse. If chest pain /headache to go to emergency room. \par  \par History of Present Illness\par 52 year old male interested in PreP . Bisexual. dosesnt smoke. med Hx: none surgury: none Allergies : None. currently taking for sHGH-somatropin, trenbolone acetate, testosterone propionate and milt thistle. . plan all labs today. hold off on truvada until labs reurn. see in one month. \par

## 2019-11-19 ENCOUNTER — MEDICATION RENEWAL (OUTPATIENT)
Age: 54
End: 2019-11-19

## 2019-11-19 ENCOUNTER — RX RENEWAL (OUTPATIENT)
Age: 54
End: 2019-11-19

## 2019-12-13 ENCOUNTER — APPOINTMENT (OUTPATIENT)
Dept: INFECTIOUS DISEASE | Facility: CLINIC | Age: 54
End: 2019-12-13
Payer: COMMERCIAL

## 2019-12-13 VITALS
SYSTOLIC BLOOD PRESSURE: 155 MMHG | OXYGEN SATURATION: 98 % | WEIGHT: 267 LBS | HEIGHT: 71 IN | HEART RATE: 88 BPM | DIASTOLIC BLOOD PRESSURE: 78 MMHG | BODY MASS INDEX: 37.38 KG/M2 | TEMPERATURE: 99 F

## 2019-12-13 LAB
25(OH)D3 SERPL-MCNC: 41 NG/ML
ALBUMIN SERPL ELPH-MCNC: 4.2 G/DL
ALP BLD-CCNC: 63 U/L
ALT SERPL-CCNC: 63 U/L
ANION GAP SERPL CALC-SCNC: 13 MMOL/L
APPEARANCE: CLEAR
AST SERPL-CCNC: 35 U/L
BACTERIA: NEGATIVE
BASOPHILS # BLD AUTO: 0.05 K/UL
BASOPHILS NFR BLD AUTO: 0.9 %
BILIRUB SERPL-MCNC: 0.4 MG/DL
BILIRUBIN URINE: NEGATIVE
BLOOD URINE: NEGATIVE
BUN SERPL-MCNC: 13 MG/DL
CALCIUM SERPL-MCNC: 9 MG/DL
CHLORIDE SERPL-SCNC: 101 MMOL/L
CO2 SERPL-SCNC: 25 MMOL/L
COLOR: YELLOW
CREAT SERPL-MCNC: 1.2 MG/DL
CYSTATIN C SERPL-MCNC: 0.98 MG/L
EOSINOPHIL # BLD AUTO: 0.11 K/UL
EOSINOPHIL NFR BLD AUTO: 1.9 %
ESTIMATED AVERAGE GLUCOSE: 137 MG/DL
GFR/BSA.PRED SERPLBLD CYS-BASED-ARV: 82 ML/MIN
GLUCOSE QUALITATIVE U: NEGATIVE
GLUCOSE SERPL-MCNC: 146 MG/DL
HBA1C MFR BLD HPLC: 6.4 %
HCT VFR BLD CALC: 43.3 %
HGB BLD-MCNC: 12.3 G/DL
HYALINE CASTS: 2 /LPF
IMM GRANULOCYTES NFR BLD AUTO: 0.3 %
KETONES URINE: NEGATIVE
LEUKOCYTE ESTERASE URINE: NEGATIVE
LYMPHOCYTES # BLD AUTO: 1.41 K/UL
LYMPHOCYTES NFR BLD AUTO: 24.1 %
MAN DIFF?: NORMAL
MCHC RBC-ENTMCNC: 23 PG
MCHC RBC-ENTMCNC: 28.4 GM/DL
MCV RBC AUTO: 81.1 FL
MICROSCOPIC-UA: NORMAL
MONOCYTES # BLD AUTO: 0.59 K/UL
MONOCYTES NFR BLD AUTO: 10.1 %
NEUTROPHILS # BLD AUTO: 3.66 K/UL
NEUTROPHILS NFR BLD AUTO: 62.7 %
NITRITE URINE: NEGATIVE
PH URINE: 6.5
PLATELET # BLD AUTO: 275 K/UL
POTASSIUM SERPL-SCNC: 4.3 MMOL/L
PROT SERPL-MCNC: 6.9 G/DL
PROTEIN URINE: ABNORMAL
RBC # BLD: 5.34 M/UL
RBC # FLD: 16.5 %
RED BLOOD CELLS URINE: 8 /HPF
SODIUM SERPL-SCNC: 139 MMOL/L
SPECIFIC GRAVITY URINE: 1.03
SQUAMOUS EPITHELIAL CELLS: 0 /HPF
UROBILINOGEN URINE: NORMAL
WBC # FLD AUTO: 5.84 K/UL
WHITE BLOOD CELLS URINE: 2 /HPF

## 2019-12-13 PROCEDURE — 99214 OFFICE O/P EST MOD 30 MIN: CPT

## 2019-12-13 RX ORDER — EMTRICITABINE AND TENOFOVIR DISOPROXIL FUMARATE 200; 300 MG/1; MG/1
200-300 TABLET, FILM COATED ORAL
Qty: 30 | Refills: 3 | Status: DISCONTINUED | COMMUNITY
Start: 2018-11-27 | End: 2019-12-13

## 2019-12-13 NOTE — ASSESSMENT
[FreeTextEntry1] : 12/13//2019-----KAVIN HANKINS is a 54 year old male being seen for a follow-up visit. Pt doing great with Blood Pressure. Change  Truvada to Descovy today for PrEP. see in 3 months. Seeing cardiology. doing great. labs today.  [Treatment Education] : treatment education [Treatment Adherence] : treatment adherence [Anticipatory Guidance] : anticipatory guidance [Drug Interactions / Side Effects] : drug interactions/side effects

## 2019-12-13 NOTE — HISTORY OF PRESENT ILLNESS
[FreeTextEntry1] : History of Present Illness\par Reason For Visit\par 12/13//2019-----KAVIN HANKINS is a 54 year old male being seen for a follow-up visit. Pt doing great with Blood Pressure. Change  Truvada to Descovy today for PrEP. see in 3 months. Seeing cardiology. doing great. labs today. \par  \par History of Present Illness\par Reason For Visit\par KAVIN HANKINS is a 53 year old male being seen for a 2nd evaluation. Concern over blood presure 120/119. pt states taking growth hormone and other steroids for body building. Pt needs a primarycare provider. Pt states no headaches or blurred vision or body weakness. Plan . Pt needs to cut down or stop steroids !!!! needs blood pressure cuff and needs a PCP!! start low dose HCTZ 12.5 today and see in a week. labs today. EKG today See [facility] today. pt also told of Stroke or MI from steroid use/abuse. If chest pain /headache to go to emergency room. \par  \par History of Present Illness\par 52 year old male interested in PreP . Bisexual. dosesnt smoke. med Hx: none surgury: none Allergies : None. currently taking for sHGH-somatropin, trenbolone acetate, testosterone propionate and milt thistle. . plan all labs today. hold off on truvada until labs reurn. see in one month. \par \par  \par Active Problems\par Anxiety (300.00) (F41.9)\par Cardiomyopathy (425.4) (I42.9)\par Dyspnea on exertion (786.09) (R06.09)\par Exposure to communicable disease (V01.9) (Z20.9)\par HTN (hypertension) (401.9) (I10)\par \par Current Meds\par ALPRAZolam 0.5 MG Oral Tablet; TAKE 1 TABLET EVERY 6 TO 8 HOURS AS NEEDED.\par MDD:4 tabs\par Escitalopram Oxalate 10 MG Oral Tablet; TAKE 1 TABLET DAILY\par Losartan Potassium 100 MG Oral Tablet; TAKE 1 TABLET DAILY\par Metoprolol Succinate  MG Oral Tablet Extended Release 24 Hour; TAKE 1 TABLET\par BY MOUTH EVERY DAY\par raNITIdine HCl - 300 MG Oral Tablet; TAKE 1 TABLET AT BEDTIME\par Suprep Bowel Prep Kit 17.5-3.13-1.6 GM/177ML Oral Solution; TAKE AS DIRECTED\par Truvada 200-300 MG Oral Tablet; TAKE ONE TABLET BY MOUTH DAILY\par Xifaxan 550 MG Oral Tablet; TAKE 1 TABLET BY MOUTH TWICE A DAY FOR 15 DAYS\par \par Allergies\par No Known Drug Allergies\par

## 2019-12-16 LAB
C TRACH RRNA SPEC QL NAA+PROBE: NOT DETECTED
C TRACH RRNA SPEC QL NAA+PROBE: NOT DETECTED
HIV1+2 AB SPEC QL IA.RAPID: NONREACTIVE
M TB IFN-G BLD-IMP: NEGATIVE
N GONORRHOEA RRNA SPEC QL NAA+PROBE: NOT DETECTED
N GONORRHOEA RRNA SPEC QL NAA+PROBE: NOT DETECTED
QUANTIFERON TB PLUS MITOGEN MINUS NIL: 8.4 IU/ML
QUANTIFERON TB PLUS NIL: 0.02 IU/ML
QUANTIFERON TB PLUS TB1 MINUS NIL: 0.01 IU/ML
QUANTIFERON TB PLUS TB2 MINUS NIL: 0.01 IU/ML
SOURCE AMPLIFICATION: NORMAL
SOURCE ANAL: NORMAL
T PALLIDUM AB SER QL IA: NEGATIVE

## 2019-12-17 ENCOUNTER — MED ADMIN CHARGE (OUTPATIENT)
Age: 54
End: 2019-12-17

## 2019-12-17 ENCOUNTER — APPOINTMENT (OUTPATIENT)
Dept: INFECTIOUS DISEASE | Facility: CLINIC | Age: 54
End: 2019-12-17
Payer: COMMERCIAL

## 2019-12-17 LAB
C TRACH RRNA SPEC QL NAA+PROBE: NOT DETECTED
N GONORRHOEA RRNA SPEC QL NAA+PROBE: DETECTED
SOURCE ORAL: NORMAL

## 2019-12-17 PROCEDURE — 96372 THER/PROPH/DIAG INJ SC/IM: CPT

## 2019-12-17 RX ORDER — CEFTRIAXONE 250 MG/1
250 INJECTION, POWDER, FOR SOLUTION INTRAMUSCULAR; INTRAVENOUS
Qty: 1 | Refills: 0 | Status: COMPLETED | OUTPATIENT
Start: 2019-12-17

## 2019-12-17 RX ADMIN — CEFTRIAXONE SODIUM 1 MG: 250 INJECTION, POWDER, FOR SOLUTION INTRAMUSCULAR; INTRAVENOUS at 00:00

## 2019-12-18 ENCOUNTER — NON-APPOINTMENT (OUTPATIENT)
Age: 54
End: 2019-12-18

## 2019-12-18 ENCOUNTER — APPOINTMENT (OUTPATIENT)
Dept: CARDIOLOGY | Facility: CLINIC | Age: 54
End: 2019-12-18
Payer: COMMERCIAL

## 2019-12-18 VITALS
BODY MASS INDEX: 37.52 KG/M2 | OXYGEN SATURATION: 95 % | SYSTOLIC BLOOD PRESSURE: 175 MMHG | DIASTOLIC BLOOD PRESSURE: 78 MMHG | WEIGHT: 268 LBS | HEART RATE: 71 BPM | HEIGHT: 71 IN

## 2019-12-18 VITALS — DIASTOLIC BLOOD PRESSURE: 82 MMHG | SYSTOLIC BLOOD PRESSURE: 136 MMHG

## 2019-12-18 PROCEDURE — 93000 ELECTROCARDIOGRAM COMPLETE: CPT

## 2019-12-18 PROCEDURE — 99214 OFFICE O/P EST MOD 30 MIN: CPT

## 2019-12-18 RX ORDER — AZITHROMYCIN 500 MG/1
500 TABLET, FILM COATED ORAL
Qty: 2 | Refills: 0 | Status: DISCONTINUED | COMMUNITY
Start: 2019-12-16 | End: 2019-12-18

## 2019-12-18 RX ORDER — RIFAXIMIN 550 MG/1
550 TABLET ORAL
Qty: 30 | Refills: 0 | Status: DISCONTINUED | COMMUNITY
Start: 2019-03-29 | End: 2019-12-18

## 2020-03-04 ENCOUNTER — APPOINTMENT (OUTPATIENT)
Dept: CARDIOLOGY | Facility: CLINIC | Age: 55
End: 2020-03-04
Payer: COMMERCIAL

## 2020-03-04 PROCEDURE — 93306 TTE W/DOPPLER COMPLETE: CPT

## 2020-03-11 ENCOUNTER — APPOINTMENT (OUTPATIENT)
Dept: CARDIOLOGY | Facility: CLINIC | Age: 55
End: 2020-03-11
Payer: COMMERCIAL

## 2020-03-11 ENCOUNTER — NON-APPOINTMENT (OUTPATIENT)
Age: 55
End: 2020-03-11

## 2020-03-11 VITALS
SYSTOLIC BLOOD PRESSURE: 149 MMHG | HEART RATE: 67 BPM | DIASTOLIC BLOOD PRESSURE: 89 MMHG | WEIGHT: 266 LBS | HEIGHT: 71 IN | OXYGEN SATURATION: 97 % | BODY MASS INDEX: 37.24 KG/M2

## 2020-03-11 PROCEDURE — 93000 ELECTROCARDIOGRAM COMPLETE: CPT

## 2020-03-11 PROCEDURE — 99214 OFFICE O/P EST MOD 30 MIN: CPT

## 2020-03-13 ENCOUNTER — APPOINTMENT (OUTPATIENT)
Dept: INFECTIOUS DISEASE | Facility: CLINIC | Age: 55
End: 2020-03-13

## 2020-04-03 ENCOUNTER — RX RENEWAL (OUTPATIENT)
Age: 55
End: 2020-04-03

## 2020-05-01 ENCOUNTER — APPOINTMENT (OUTPATIENT)
Dept: INFECTIOUS DISEASE | Facility: CLINIC | Age: 55
End: 2020-05-01
Payer: COMMERCIAL

## 2020-05-01 PROCEDURE — 99442: CPT

## 2020-05-08 ENCOUNTER — APPOINTMENT (OUTPATIENT)
Dept: INFECTIOUS DISEASE | Facility: CLINIC | Age: 55
End: 2020-05-08

## 2020-06-05 ENCOUNTER — APPOINTMENT (OUTPATIENT)
Dept: INFECTIOUS DISEASE | Facility: CLINIC | Age: 55
End: 2020-06-05

## 2020-06-05 LAB
AFP-TM SERPL-MCNC: 2.5 NG/ML
ALBUMIN SERPL ELPH-MCNC: 4.6 G/DL
ALP BLD-CCNC: 103 U/L
ALT SERPL-CCNC: 57 U/L
ANION GAP SERPL CALC-SCNC: 11 MMOL/L
AST SERPL-CCNC: 48 U/L
BASOPHILS # BLD AUTO: 0.04 K/UL
BASOPHILS NFR BLD AUTO: 0.5 %
BILIRUB SERPL-MCNC: 0.4 MG/DL
BUN SERPL-MCNC: 16 MG/DL
CALCIUM SERPL-MCNC: 9.3 MG/DL
CHLORIDE SERPL-SCNC: 101 MMOL/L
CHOLEST SERPL-MCNC: 140 MG/DL
CHOLEST/HDLC SERPL: 5 RATIO
CO2 SERPL-SCNC: 28 MMOL/L
CREAT SERPL-MCNC: 0.95 MG/DL
EOSINOPHIL # BLD AUTO: 0.19 K/UL
EOSINOPHIL NFR BLD AUTO: 2.4 %
GLUCOSE SERPL-MCNC: 132 MG/DL
HCT VFR BLD CALC: 44.5 %
HDLC SERPL-MCNC: 28 MG/DL
HGB BLD-MCNC: 13.8 G/DL
IMM GRANULOCYTES NFR BLD AUTO: 0.4 %
LDLC SERPL CALC-MCNC: 75 MG/DL
LYMPHOCYTES # BLD AUTO: 1.68 K/UL
LYMPHOCYTES NFR BLD AUTO: 21.6 %
MAN DIFF?: NORMAL
MCHC RBC-ENTMCNC: 28.8 PG
MCHC RBC-ENTMCNC: 31 GM/DL
MCV RBC AUTO: 92.9 FL
MONOCYTES # BLD AUTO: 0.67 K/UL
MONOCYTES NFR BLD AUTO: 8.6 %
NEUTROPHILS # BLD AUTO: 5.16 K/UL
NEUTROPHILS NFR BLD AUTO: 66.5 %
PLATELET # BLD AUTO: 233 K/UL
POTASSIUM SERPL-SCNC: 4.9 MMOL/L
PROT SERPL-MCNC: 6.9 G/DL
RBC # BLD: 4.79 M/UL
RBC # FLD: 13 %
SODIUM SERPL-SCNC: 140 MMOL/L
TRIGL SERPL-MCNC: 189 MG/DL
WBC # FLD AUTO: 7.77 K/UL

## 2020-06-08 LAB
25(OH)D3 SERPL-MCNC: 56.2 NG/ML
APPEARANCE: CLEAR
BACTERIA: NEGATIVE
BILIRUBIN URINE: NEGATIVE
BLOOD URINE: NEGATIVE
C TRACH RRNA SPEC QL NAA+PROBE: NOT DETECTED
C TRACH RRNA SPEC QL NAA+PROBE: NOT DETECTED
COLOR: YELLOW
ESTIMATED AVERAGE GLUCOSE: 143 MG/DL
GLUCOSE QUALITATIVE U: NEGATIVE
HBA1C MFR BLD HPLC: 6.6 %
HIV1 RNA # SERPL NAA+PROBE: NORMAL
HIV1 RNA # SERPL NAA+PROBE: NORMAL COPIES/ML
HIV1+2 AB SPEC QL IA.RAPID: NONREACTIVE
HYALINE CASTS: 0 /LPF
KETONES URINE: NEGATIVE
LEUKOCYTE ESTERASE URINE: NEGATIVE
MICROSCOPIC-UA: NORMAL
N GONORRHOEA RRNA SPEC QL NAA+PROBE: NOT DETECTED
N GONORRHOEA RRNA SPEC QL NAA+PROBE: NOT DETECTED
NITRITE URINE: NEGATIVE
PH URINE: 7
PROTEIN URINE: NORMAL
RED BLOOD CELLS URINE: 1 /HPF
SOURCE AMPLIFICATION: NORMAL
SOURCE ORAL: NORMAL
SPECIFIC GRAVITY URINE: 1.02
SQUAMOUS EPITHELIAL CELLS: 0 /HPF
T PALLIDUM AB SER QL IA: NEGATIVE
UROBILINOGEN URINE: NORMAL
VIRAL LOAD INTERP: NORMAL
VIRAL LOAD LOG: NORMAL LG COP/ML
WHITE BLOOD CELLS URINE: 2 /HPF

## 2020-08-13 ENCOUNTER — RX RENEWAL (OUTPATIENT)
Age: 55
End: 2020-08-13

## 2020-10-01 NOTE — ED PROVIDER NOTE - CROS ED RESP ALL NEG
Brief Operative Note    Patient: Lemuel Brooks 73 year old male    MRN: [id number]    Surgeon(s): Jagdish Michele MD  Phone Number: [phone]                       Surgeon(s) and Role:     * Jagdish Michele MD - Primary    Pre-Op Diagnosis: Arthritis of right knee [M17.11]     Post-Op Diagnosis: same     Procedure: Procedure(s):  TOTAL RIGHT KNEE ARTHROPLASTY    Anesthesia Type: General                                   Complications: None    Description: arthritis    Findings: arthritis    Specimens Removed: No specimens collected during this procedure.     Estimated Blood Loss: 150cc    Assistant Tasks: Opening and closing     Implants:   Implant Name Type Inv. Item Serial No.  Lot No. LRB No. Used Action   CEMENT BN SMPX 20ML 40GM HVISC STRL - S. Filler CEMENT BN SMPX 20ML 40GM HVISC STRL . Retail Info [id number] Right 1 Implanted   TRAY TIB 83MM ASCENT MXM KN INTLK COCR 1 PC CRCTE - S. Component TRAY TIB 83MM ASCENT MXM KN INTLK COCR 1 PC CRCTE . [employer] [id number] Right 1 Implanted   COMPONENT PTLR ASYM 8.5MM 34MM 3 PEG WIRE VNGRD KN - S. Component COMPONENT PTLR ASYM 8.5MM 34MM 3 PEG WIRE VNGRD KN . [employer] [id number] Right 1 Implanted   COMPONENT FEM 72.5MM KN RT CEMENT POSTERIOR - S. Component COMPONENT FEM 72.5MM KN RT CEMENT POSTERIOR . [employer] [id number] Right 1 Implanted   BEARING TIB KN [id number] VNGRD POSTERIOR - S. Liner BEARING TIB KN [id number] VNGRD POSTERIOR . [employer] [id number] Right 1 Implanted         I was present for the key portions of the procedure and was immediately available for the non-key portions      
negative...

## 2020-10-08 ENCOUNTER — APPOINTMENT (OUTPATIENT)
Dept: INFECTIOUS DISEASE | Facility: CLINIC | Age: 55
End: 2020-10-08
Payer: COMMERCIAL

## 2020-10-08 VITALS
TEMPERATURE: 97.8 F | SYSTOLIC BLOOD PRESSURE: 162 MMHG | HEIGHT: 71 IN | OXYGEN SATURATION: 96 % | DIASTOLIC BLOOD PRESSURE: 83 MMHG | BODY MASS INDEX: 39.06 KG/M2 | HEART RATE: 62 BPM | WEIGHT: 279 LBS

## 2020-10-08 DIAGNOSIS — Z23 ENCOUNTER FOR IMMUNIZATION: ICD-10-CM

## 2020-10-08 LAB
BASOPHILS # BLD AUTO: 0.04 K/UL
BASOPHILS NFR BLD AUTO: 0.7 %
EOSINOPHIL # BLD AUTO: 0.13 K/UL
EOSINOPHIL NFR BLD AUTO: 2.4 %
HCT VFR BLD CALC: 45.1 %
HGB BLD-MCNC: 13.6 G/DL
IMM GRANULOCYTES NFR BLD AUTO: 0.2 %
LYMPHOCYTES # BLD AUTO: 1.49 K/UL
LYMPHOCYTES NFR BLD AUTO: 27.9 %
MAN DIFF?: NORMAL
MCHC RBC-ENTMCNC: 25.8 PG
MCHC RBC-ENTMCNC: 30.2 GM/DL
MCV RBC AUTO: 85.6 FL
MONOCYTES # BLD AUTO: 0.67 K/UL
MONOCYTES NFR BLD AUTO: 12.5 %
NEUTROPHILS # BLD AUTO: 3 K/UL
NEUTROPHILS NFR BLD AUTO: 56.3 %
PLATELET # BLD AUTO: 271 K/UL
RBC # BLD: 5.27 M/UL
RBC # FLD: 14.2 %
WBC # FLD AUTO: 5.34 K/UL

## 2020-10-08 PROCEDURE — 90686 IIV4 VACC NO PRSV 0.5 ML IM: CPT

## 2020-10-08 PROCEDURE — G0008: CPT

## 2020-10-08 PROCEDURE — 99214 OFFICE O/P EST MOD 30 MIN: CPT | Mod: 25

## 2020-10-08 NOTE — HISTORY OF PRESENT ILLNESS
[FreeTextEntry1] : 10/8/2020\par KAVIN HANKINS is a 55 year old male being seen for a follow-up visit. Pt doing better with Blood Pressure and following up with cardiology. Pt was on Truvada and changed to Descovy. We discussed all recent lab results. \par plan 10/8/2020: \par 1) labs today\par 2) see in person in 3 months. \par 3) flu vaccine today. \par  \par History of Present Illness\par Reason For Visit\par KAVIN HANKINS is a 53 year old male being seen for a 2nd evaluation. Concern over blood presure 120/119. pt states taking growth hormone and other steroids for body building. Pt needs a primarycare provider. Pt states no headaches or blurred vision or body weakness. Plan. Pt needs to cut down or stop steroids !!!! needs blood pressure cuff and needs a PCP!! start low dose HCTZ 12.5 today and see in a week. labs today. EKG today See [facility] today. pt also told of Stroke or MI from steroid use/abuse. If chest pain /headache to go to emergency room. \par  \par History of Present Illness\par 52 year old male interested in PreP. Bisexual. dosesnt smoke. med Hx: none surgury: none Allergies : None. currently taking for sHGH-somatropin, trenbolone acetate, testosterone propionate and milt thistle.. plan all labs today. hold off on truvada until labs reurn. see in one month. \par \par

## 2020-10-13 LAB
25(OH)D3 SERPL-MCNC: 50.4 NG/ML
ALBUMIN SERPL ELPH-MCNC: 4.7 G/DL
ALP BLD-CCNC: 76 U/L
ALT SERPL-CCNC: 38 U/L
ANION GAP SERPL CALC-SCNC: 11 MMOL/L
APPEARANCE: CLEAR
AST SERPL-CCNC: 29 U/L
BACTERIA: NEGATIVE
BILIRUB SERPL-MCNC: 0.5 MG/DL
BILIRUBIN URINE: NEGATIVE
BLOOD URINE: NEGATIVE
BUN SERPL-MCNC: 13 MG/DL
C TRACH RRNA SPEC QL NAA+PROBE: NOT DETECTED
CALCIUM SERPL-MCNC: 9.2 MG/DL
CHLORIDE SERPL-SCNC: 101 MMOL/L
CHOLEST SERPL-MCNC: 239 MG/DL
CHOLEST/HDLC SERPL: 19.9 RATIO
CO2 SERPL-SCNC: 27 MMOL/L
COLOR: YELLOW
CREAT SERPL-MCNC: 1.14 MG/DL
CYSTATIN C SERPL-MCNC: 0.94 MG/L
ESTIMATED AVERAGE GLUCOSE: 157 MG/DL
GFR/BSA.PRED SERPLBLD CYS-BASED-ARV: 87 ML/MIN
GLUCOSE QUALITATIVE U: NEGATIVE
GLUCOSE SERPL-MCNC: 95 MG/DL
HBA1C MFR BLD HPLC: 7.1 %
HDLC SERPL-MCNC: 12 MG/DL
HIV1+2 AB SPEC QL IA.RAPID: NONREACTIVE
HYALINE CASTS: 0 /LPF
KETONES URINE: NEGATIVE
LDLC SERPL CALC-MCNC: 201 MG/DL
LEUKOCYTE ESTERASE URINE: ABNORMAL
M TB IFN-G BLD-IMP: NEGATIVE
MICROSCOPIC-UA: NORMAL
N GONORRHOEA RRNA SPEC QL NAA+PROBE: NOT DETECTED
NITRITE URINE: NEGATIVE
PH URINE: 7.5
POTASSIUM SERPL-SCNC: 5.1 MMOL/L
PROT SERPL-MCNC: 7.1 G/DL
PROTEIN URINE: ABNORMAL
PSA SERPL-MCNC: 0.99 NG/ML
QUANTIFERON TB PLUS MITOGEN MINUS NIL: 7.98 IU/ML
QUANTIFERON TB PLUS NIL: 0.03 IU/ML
QUANTIFERON TB PLUS TB1 MINUS NIL: 0 IU/ML
QUANTIFERON TB PLUS TB2 MINUS NIL: -0.01 IU/ML
RED BLOOD CELLS URINE: 4 /HPF
SARS-COV-2 IGG SERPL IA-ACNC: 0.1 INDEX
SARS-COV-2 IGG SERPL QL IA: NEGATIVE
SODIUM SERPL-SCNC: 139 MMOL/L
SOURCE AMPLIFICATION: NORMAL
SOURCE ANAL: NORMAL
SOURCE ORAL: NORMAL
SPECIFIC GRAVITY URINE: 1.03
SQUAMOUS EPITHELIAL CELLS: 1 /HPF
T PALLIDUM AB SER QL IA: NEGATIVE
TRIGL SERPL-MCNC: 127 MG/DL
UROBILINOGEN URINE: ABNORMAL
WHITE BLOOD CELLS URINE: 7 /HPF

## 2020-10-15 ENCOUNTER — NON-APPOINTMENT (OUTPATIENT)
Age: 55
End: 2020-10-15

## 2020-10-15 ENCOUNTER — APPOINTMENT (OUTPATIENT)
Dept: CARDIOLOGY | Facility: CLINIC | Age: 55
End: 2020-10-15
Payer: COMMERCIAL

## 2020-10-15 VITALS
WEIGHT: 276 LBS | OXYGEN SATURATION: 96 % | DIASTOLIC BLOOD PRESSURE: 81 MMHG | HEART RATE: 58 BPM | BODY MASS INDEX: 38.64 KG/M2 | SYSTOLIC BLOOD PRESSURE: 158 MMHG | HEIGHT: 71 IN

## 2020-10-15 VITALS — DIASTOLIC BLOOD PRESSURE: 72 MMHG | SYSTOLIC BLOOD PRESSURE: 148 MMHG

## 2020-10-15 DIAGNOSIS — Z00.00 ENCOUNTER FOR GENERAL ADULT MEDICAL EXAMINATION W/OUT ABNORMAL FINDINGS: ICD-10-CM

## 2020-10-15 PROCEDURE — 93000 ELECTROCARDIOGRAM COMPLETE: CPT

## 2020-10-15 PROCEDURE — 99214 OFFICE O/P EST MOD 30 MIN: CPT

## 2020-10-16 LAB
TESTOST BND SERPL-MCNC: 1.9 PG/ML
TESTOST SERPL-MCNC: 48.9 NG/DL

## 2020-11-12 LAB
ALBUMIN SERPL ELPH-MCNC: 4.7 G/DL
ALP BLD-CCNC: 88 U/L
ALT SERPL-CCNC: 48 U/L
ANION GAP SERPL CALC-SCNC: 13 MMOL/L
AST SERPL-CCNC: 29 U/L
BASOPHILS # BLD AUTO: 0.04 K/UL
BASOPHILS NFR BLD AUTO: 0.6 %
BILIRUB SERPL-MCNC: 0.4 MG/DL
BUN SERPL-MCNC: 16 MG/DL
CALCIUM SERPL-MCNC: 9.4 MG/DL
CHLORIDE SERPL-SCNC: 99 MMOL/L
CHOLEST SERPL-MCNC: 160 MG/DL
CO2 SERPL-SCNC: 28 MMOL/L
CREAT SERPL-MCNC: 1.03 MG/DL
EOSINOPHIL # BLD AUTO: 0.18 K/UL
EOSINOPHIL NFR BLD AUTO: 2.7 %
ESTIMATED AVERAGE GLUCOSE: 143 MG/DL
GLUCOSE SERPL-MCNC: 114 MG/DL
HBA1C MFR BLD HPLC: 6.6 %
HCT VFR BLD CALC: 42.8 %
HDLC SERPL-MCNC: 22 MG/DL
HGB BLD-MCNC: 12.8 G/DL
IMM GRANULOCYTES NFR BLD AUTO: 0.3 %
LDLC SERPL CALC-MCNC: NORMAL MG/DL
LYMPHOCYTES # BLD AUTO: 1.79 K/UL
LYMPHOCYTES NFR BLD AUTO: 26.9 %
MAN DIFF?: NORMAL
MCHC RBC-ENTMCNC: 25.6 PG
MCHC RBC-ENTMCNC: 29.9 GM/DL
MCV RBC AUTO: 85.6 FL
MONOCYTES # BLD AUTO: 0.89 K/UL
MONOCYTES NFR BLD AUTO: 13.4 %
NEUTROPHILS # BLD AUTO: 3.74 K/UL
NEUTROPHILS NFR BLD AUTO: 56.1 %
NONHDLC SERPL-MCNC: 138 MG/DL
PLATELET # BLD AUTO: 254 K/UL
POTASSIUM SERPL-SCNC: 4.4 MMOL/L
PROT SERPL-MCNC: 7 G/DL
RBC # BLD: 5 M/UL
RBC # FLD: 15.9 %
SODIUM SERPL-SCNC: 140 MMOL/L
TRIGL SERPL-MCNC: 408 MG/DL
WBC # FLD AUTO: 6.66 K/UL

## 2020-11-16 ENCOUNTER — APPOINTMENT (OUTPATIENT)
Dept: CARDIOLOGY | Facility: CLINIC | Age: 55
End: 2020-11-16
Payer: COMMERCIAL

## 2020-11-16 VITALS
OXYGEN SATURATION: 100 % | SYSTOLIC BLOOD PRESSURE: 144 MMHG | HEART RATE: 78 BPM | DIASTOLIC BLOOD PRESSURE: 83 MMHG | HEIGHT: 71 IN | BODY MASS INDEX: 38.64 KG/M2 | WEIGHT: 276 LBS

## 2020-11-16 PROCEDURE — 99072 ADDL SUPL MATRL&STAF TM PHE: CPT

## 2020-11-16 PROCEDURE — 99214 OFFICE O/P EST MOD 30 MIN: CPT

## 2020-11-16 RX ORDER — RANITIDINE 300 MG/1
300 TABLET ORAL
Qty: 30 | Refills: 0 | Status: DISCONTINUED | COMMUNITY
Start: 2018-12-31 | End: 2020-11-16

## 2020-11-16 RX ORDER — SODIUM SULFATE, POTASSIUM SULFATE, MAGNESIUM SULFATE 17.5; 3.13; 1.6 G/ML; G/ML; G/ML
17.5-3.13-1.6 SOLUTION, CONCENTRATE ORAL
Qty: 354 | Refills: 0 | Status: DISCONTINUED | COMMUNITY
Start: 2019-03-29 | End: 2020-11-16

## 2020-11-20 LAB
HDL-C: 24 MG/DL
HDL-P (TOTAL): 19.6 UMOL/L
LDL SIZE: 20 NM
LDL-C: 95 MG/DL
LDL-P: 1078 NMOL/L
LP-IR SCORE: 80
NMR CHOLESTEROL, TOTAL: 153 MG/DL
SMALL LDL-P: 783 NMOL/L
TRIGL SERPL-MCNC: 197 MG/DL

## 2020-12-24 ENCOUNTER — RX RENEWAL (OUTPATIENT)
Age: 55
End: 2020-12-24

## 2021-01-27 ENCOUNTER — APPOINTMENT (OUTPATIENT)
Dept: OTOLARYNGOLOGY | Facility: CLINIC | Age: 56
End: 2021-01-27
Payer: COMMERCIAL

## 2021-01-27 ENCOUNTER — RX RENEWAL (OUTPATIENT)
Age: 56
End: 2021-01-27

## 2021-01-27 VITALS
HEIGHT: 71 IN | SYSTOLIC BLOOD PRESSURE: 150 MMHG | BODY MASS INDEX: 38.36 KG/M2 | WEIGHT: 274 LBS | TEMPERATURE: 97.4 F | DIASTOLIC BLOOD PRESSURE: 82 MMHG

## 2021-01-27 DIAGNOSIS — Z86.79 PERSONAL HISTORY OF OTHER DISEASES OF THE CIRCULATORY SYSTEM: ICD-10-CM

## 2021-01-27 DIAGNOSIS — J98.8 OTHER SPECIFIED RESPIRATORY DISORDERS: ICD-10-CM

## 2021-01-27 PROCEDURE — 99213 OFFICE O/P EST LOW 20 MIN: CPT | Mod: 25

## 2021-01-27 PROCEDURE — 31575 DIAGNOSTIC LARYNGOSCOPY: CPT

## 2021-01-27 PROCEDURE — 99072 ADDL SUPL MATRL&STAF TM PHE: CPT

## 2021-01-27 RX ORDER — ALPRAZOLAM 0.5 MG/1
0.5 TABLET ORAL
Qty: 28 | Refills: 0 | Status: COMPLETED | COMMUNITY
Start: 2019-09-18 | End: 2021-01-27

## 2021-01-27 NOTE — ASSESSMENT
[FreeTextEntry1] : Reviewed and reconciled medications, allergies, PMHx, PSHx, SocHx, FMHx.\par \par h/o sleep apnea - uses CPAP machine, reflux\par crowded oral airway\par edema of the postcricoid, arytenoids and interarytenoids consistent with reflux\par \par compliance report 10/16/2020-1/13/2021: not compliant, avg 2 hours, used 40% of the time\par \par Plan:\par Cerumen removed. Flexible laryngoscopy. Continue CPAP. Continue Prilosec QD. Pepcid QHS.

## 2021-01-27 NOTE — HISTORY OF PRESENT ILLNESS
[de-identified] : The patient presents with h/o sleep apnea - uses CPAP machine, reflux. Pt is taking Prilosec QD with no reflux sx. He ran out of Dexilant. He has not been able to get supplies for his CPAP but is using it with improvement. He has been using Astelin and Flonase as well. Pt states that he feels like something is moving around in his left ear which started recently.

## 2021-01-27 NOTE — REASON FOR VISIT
[Subsequent Evaluation] : a subsequent evaluation for [FreeTextEntry2] : wax removal/ script renewal

## 2021-01-27 NOTE — ADDENDUM
[FreeTextEntry1] : Documented by Mary Lou Escudero acting as scribe for Dr. Boyd on 01/27/2021.\par \par All Medical record entries made by the Scribe were at my, Dr. Boyd, direction and personally dictated by me on 01/27/2021 . I have reviewed the chart and agree that the record accurately reflects my personal performance of the history, physical exam, assessment and plan. I have also personally directed, reviewed, and agreed with the discharge instructions.

## 2021-01-27 NOTE — CONSULT LETTER
[Dear  ___] : Dear  [unfilled], [Courtesy Letter:] : I had the pleasure of seeing your patient, [unfilled], in my office today. [Please see my note below.] : Please see my note below. [Consult Closing:] : Thank you very much for allowing me to participate in the care of this patient.  If you have any questions, please do not hesitate to contact me. [Sincerely,] : Sincerely, [FreeTextEntry3] : Rob Boyd MD FACS

## 2021-01-27 NOTE — PHYSICAL EXAM
[Hearing Olguin Test (Tuning Fork On Forehead)] : no lateralization of tone [Normal] : no rashes [Hearing Loss Right Only] : normal [Hearing Loss Left Only] : normal [FreeTextEntry9] : small piece of cerumen deep in the canal removed via suction [FreeTextEntry1] : crowded airway, tonsils class 2, uvula edema [FreeTextEntry2] : sinuses nontender to percussion

## 2021-01-27 NOTE — PROCEDURE
[de-identified] : Procedure:  Flexible Fiberoptic Laryngoscopy: Risks, benefits, and alternatives of flexible endoscopy were explained to the patient.  Specific mention was made of the risk of throat numbness.  The patient gave oral consent to proceed.  The nasal cavities were decongested and anesthetized with a combination of oxymetazoline and 4% lidocaine solution.  The flexible scope was inserted into the left nasal cavity and advanced towards the nasopharynx.  Visualized mucosa over the turbinates and septum were as described above. Clear mucus. The nasopharynx was clear. Crowded airway at the level of the soft palate, tonsils and tongue.  Oropharyngeal walls were symmetric and mobile without lesion, mass, or edema.  Hypopharynx was also without  lesion or edema.  Larynx was mobile without lesions. Edema of the postcricoid, arytenoids and interarytenoids. No pooling. True vocal folds were white without mass or lesion.  Base of tongue was within normal limits.

## 2021-02-18 ENCOUNTER — APPOINTMENT (OUTPATIENT)
Dept: INFECTIOUS DISEASE | Facility: CLINIC | Age: 56
End: 2021-02-18
Payer: COMMERCIAL

## 2021-02-18 VITALS
SYSTOLIC BLOOD PRESSURE: 166 MMHG | OXYGEN SATURATION: 97 % | HEART RATE: 73 BPM | TEMPERATURE: 98.4 F | DIASTOLIC BLOOD PRESSURE: 72 MMHG | HEIGHT: 71 IN | WEIGHT: 270 LBS | BODY MASS INDEX: 37.8 KG/M2

## 2021-02-18 PROCEDURE — 99072 ADDL SUPL MATRL&STAF TM PHE: CPT

## 2021-02-18 PROCEDURE — 99212 OFFICE O/P EST SF 10 MIN: CPT

## 2021-02-18 NOTE — HISTORY OF PRESENT ILLNESS
[FreeTextEntry1] : History of Present Illness\par 2/18/2021\par KAVIN HANKINS is a 55 year old male being seen for a follow-up visit. Pt doing better with Blood Pressure and following up with cardiology. Pt was on Truvada and was changed to Descovy. We discussed all recent lab results. \par plan 12/18/2021: \par 1) labs today\par 2) see in person in 3 months. \par \par  \par History of Present Illness\par Reason For Visit\par KAVIN HANKINS is a 53 year old male being seen for a 2nd evaluation. Concern over blood presure 120/119. pt states taking growth hormone and other steroids for body building. Pt needs a primarycare provider. Pt states no headaches or blurred vision or body weakness. Plan. Pt needs to cut down or stop steroids !!!! needs blood pressure cuff and needs a PCP!! start low dose HCTZ 12.5 today and see in a week. labs today. EKG today See [facility] today. pt also told of Stroke or MI from steroid use/abuse. If chest pain /headache to go to emergency room. \par  \par History of Present Illness\par 52 year old male interested in PreP. Bisexual. dosesnt smoke. med Hx: none surgury: none Allergies : None. currently taking for sHGH-somatropin, trenbolone acetate, testosterone propionate and milt thistle.. plan all labs today. hold off on truvada until labs reurn. see in one month. \par

## 2021-02-19 DIAGNOSIS — Z86.39 PERSONAL HISTORY OF OTHER ENDOCRINE, NUTRITIONAL AND METABOLIC DISEASE: ICD-10-CM

## 2021-02-19 DIAGNOSIS — R73.09 OTHER ABNORMAL GLUCOSE: ICD-10-CM

## 2021-02-19 DIAGNOSIS — R73.9 HYPERGLYCEMIA, UNSPECIFIED: ICD-10-CM

## 2021-02-19 LAB
ALBUMIN SERPL ELPH-MCNC: 4.5 G/DL
ALP BLD-CCNC: 79 U/L
ALT SERPL-CCNC: 45 U/L
ANION GAP SERPL CALC-SCNC: 12 MMOL/L
APPEARANCE: CLEAR
AST SERPL-CCNC: 34 U/L
BACTERIA: NEGATIVE
BASOPHILS # BLD AUTO: 0.06 K/UL
BASOPHILS NFR BLD AUTO: 0.9 %
BILIRUB SERPL-MCNC: 0.5 MG/DL
BILIRUBIN URINE: NEGATIVE
BLOOD URINE: NEGATIVE
BUN SERPL-MCNC: 13 MG/DL
C TRACH RRNA SPEC QL NAA+PROBE: NOT DETECTED
CALCIUM SERPL-MCNC: 9.3 MG/DL
CHLORIDE SERPL-SCNC: 101 MMOL/L
CHOLEST SERPL-MCNC: 203 MG/DL
CO2 SERPL-SCNC: 27 MMOL/L
COLOR: ABNORMAL
CREAT SERPL-MCNC: 1.34 MG/DL
EOSINOPHIL # BLD AUTO: 0.13 K/UL
EOSINOPHIL NFR BLD AUTO: 1.9 %
ESTIMATED AVERAGE GLUCOSE: 163 MG/DL
GLUCOSE QUALITATIVE U: NEGATIVE
GLUCOSE SERPL-MCNC: 153 MG/DL
HBA1C MFR BLD HPLC: 7.3 %
HCT VFR BLD CALC: 47.1 %
HCV AB SER QL: NONREACTIVE
HCV S/CO RATIO: 0.06 S/CO
HDLC SERPL-MCNC: 10 MG/DL
HGB BLD-MCNC: 13.1 G/DL
HIV1+2 AB SPEC QL IA.RAPID: NONREACTIVE
HYALINE CASTS: 2 /LPF
IMM GRANULOCYTES NFR BLD AUTO: 0.1 %
KETONES URINE: NORMAL
LDLC SERPL CALC-MCNC: 160 MG/DL
LEUKOCYTE ESTERASE URINE: ABNORMAL
LYMPHOCYTES # BLD AUTO: 2.01 K/UL
LYMPHOCYTES NFR BLD AUTO: 30 %
MAN DIFF?: NORMAL
MCHC RBC-ENTMCNC: 22 PG
MCHC RBC-ENTMCNC: 27.8 GM/DL
MCV RBC AUTO: 79.2 FL
MICROSCOPIC-UA: NORMAL
MONOCYTES # BLD AUTO: 0.75 K/UL
MONOCYTES NFR BLD AUTO: 11.2 %
N GONORRHOEA RRNA SPEC QL NAA+PROBE: NOT DETECTED
NEUTROPHILS # BLD AUTO: 3.73 K/UL
NEUTROPHILS NFR BLD AUTO: 55.9 %
NITRITE URINE: NEGATIVE
NONHDLC SERPL-MCNC: 193 MG/DL
PH URINE: 6
PLATELET # BLD AUTO: 331 K/UL
POTASSIUM SERPL-SCNC: 4.6 MMOL/L
PROT SERPL-MCNC: 7.3 G/DL
PROTEIN URINE: ABNORMAL
RBC # BLD: 5.95 M/UL
RBC # FLD: 18.4 %
RED BLOOD CELLS URINE: 5 /HPF
SARS-COV-2 IGG SERPL IA-ACNC: 14.8 INDEX
SARS-COV-2 IGG SERPL QL IA: POSITIVE
SODIUM SERPL-SCNC: 140 MMOL/L
SOURCE AMPLIFICATION: NORMAL
SOURCE ANAL: NORMAL
SOURCE ORAL: NORMAL
SPECIFIC GRAVITY URINE: >1.05
SQUAMOUS EPITHELIAL CELLS: 9 /HPF
T PALLIDUM AB SER QL IA: NEGATIVE
TRIGL SERPL-MCNC: 162 MG/DL
UROBILINOGEN URINE: ABNORMAL
WBC # FLD AUTO: 6.69 K/UL
WHITE BLOOD CELLS URINE: 23 /HPF

## 2021-02-22 ENCOUNTER — NON-APPOINTMENT (OUTPATIENT)
Age: 56
End: 2021-02-22

## 2021-02-23 ENCOUNTER — FORM ENCOUNTER (OUTPATIENT)
Age: 56
End: 2021-02-23

## 2021-02-24 ENCOUNTER — NON-APPOINTMENT (OUTPATIENT)
Age: 56
End: 2021-02-24

## 2021-02-24 ENCOUNTER — APPOINTMENT (OUTPATIENT)
Dept: INFECTIOUS DISEASE | Facility: CLINIC | Age: 56
End: 2021-02-24
Payer: COMMERCIAL

## 2021-02-24 PROCEDURE — 99072 ADDL SUPL MATRL&STAF TM PHE: CPT

## 2021-02-24 PROCEDURE — 97802 MEDICAL NUTRITION INDIV IN: CPT

## 2021-04-08 ENCOUNTER — NON-APPOINTMENT (OUTPATIENT)
Age: 56
End: 2021-04-08

## 2021-04-09 ENCOUNTER — NON-APPOINTMENT (OUTPATIENT)
Age: 56
End: 2021-04-09

## 2021-04-13 ENCOUNTER — NON-APPOINTMENT (OUTPATIENT)
Age: 56
End: 2021-04-13

## 2021-04-13 ENCOUNTER — RX RENEWAL (OUTPATIENT)
Age: 56
End: 2021-04-13

## 2021-05-14 ENCOUNTER — APPOINTMENT (OUTPATIENT)
Dept: INFECTIOUS DISEASE | Facility: CLINIC | Age: 56
End: 2021-05-14

## 2021-05-26 ENCOUNTER — APPOINTMENT (OUTPATIENT)
Dept: CARDIOLOGY | Facility: CLINIC | Age: 56
End: 2021-05-26
Payer: COMMERCIAL

## 2021-05-26 ENCOUNTER — NON-APPOINTMENT (OUTPATIENT)
Age: 56
End: 2021-05-26

## 2021-05-26 VITALS
DIASTOLIC BLOOD PRESSURE: 71 MMHG | BODY MASS INDEX: 37.1 KG/M2 | SYSTOLIC BLOOD PRESSURE: 168 MMHG | HEART RATE: 69 BPM | HEIGHT: 71 IN | OXYGEN SATURATION: 98 % | WEIGHT: 265 LBS

## 2021-05-26 VITALS — DIASTOLIC BLOOD PRESSURE: 80 MMHG | SYSTOLIC BLOOD PRESSURE: 130 MMHG

## 2021-05-26 PROCEDURE — 99214 OFFICE O/P EST MOD 30 MIN: CPT

## 2021-05-26 PROCEDURE — 99072 ADDL SUPL MATRL&STAF TM PHE: CPT

## 2021-05-26 PROCEDURE — 93000 ELECTROCARDIOGRAM COMPLETE: CPT

## 2021-06-02 ENCOUNTER — APPOINTMENT (OUTPATIENT)
Dept: INFECTIOUS DISEASE | Facility: CLINIC | Age: 56
End: 2021-06-02
Payer: COMMERCIAL

## 2021-06-02 VITALS
BODY MASS INDEX: 36.68 KG/M2 | WEIGHT: 262 LBS | HEART RATE: 59 BPM | SYSTOLIC BLOOD PRESSURE: 150 MMHG | TEMPERATURE: 98.3 F | HEIGHT: 71 IN | OXYGEN SATURATION: 99 % | DIASTOLIC BLOOD PRESSURE: 89 MMHG

## 2021-06-02 LAB
25(OH)D3 SERPL-MCNC: 45.2 NG/ML
ALBUMIN SERPL ELPH-MCNC: 4.7 G/DL
ALP BLD-CCNC: 101 U/L
ALT SERPL-CCNC: 29 U/L
ANION GAP SERPL CALC-SCNC: 8 MMOL/L
AST SERPL-CCNC: 23 U/L
BASOPHILS # BLD AUTO: 0.02 K/UL
BASOPHILS NFR BLD AUTO: 0.3 %
BILIRUB SERPL-MCNC: 0.5 MG/DL
BUN SERPL-MCNC: 17 MG/DL
CALCIUM SERPL-MCNC: 9.5 MG/DL
CHLORIDE SERPL-SCNC: 102 MMOL/L
CHOLEST SERPL-MCNC: 152 MG/DL
CO2 SERPL-SCNC: 29 MMOL/L
CREAT SERPL-MCNC: 1.18 MG/DL
EOSINOPHIL # BLD AUTO: 0.08 K/UL
EOSINOPHIL NFR BLD AUTO: 1.3 %
GLUCOSE SERPL-MCNC: 128 MG/DL
HCT VFR BLD CALC: 51.3 %
HDLC SERPL-MCNC: 27 MG/DL
HGB BLD-MCNC: 14.4 G/DL
HIV1+2 AB SPEC QL IA.RAPID: NONREACTIVE
IMM GRANULOCYTES NFR BLD AUTO: 0.2 %
LDLC SERPL CALC-MCNC: 84 MG/DL
LYMPHOCYTES # BLD AUTO: 2.25 K/UL
LYMPHOCYTES NFR BLD AUTO: 35.9 %
MAN DIFF?: NORMAL
MCHC RBC-ENTMCNC: 23.8 PG
MCHC RBC-ENTMCNC: 28.1 GM/DL
MCV RBC AUTO: 84.9 FL
MONOCYTES # BLD AUTO: 0.69 K/UL
MONOCYTES NFR BLD AUTO: 11 %
NEUTROPHILS # BLD AUTO: 3.22 K/UL
NEUTROPHILS NFR BLD AUTO: 51.3 %
NONHDLC SERPL-MCNC: 125 MG/DL
PLATELET # BLD AUTO: 215 K/UL
POTASSIUM SERPL-SCNC: 5 MMOL/L
PROT SERPL-MCNC: 7.4 G/DL
RBC # BLD: 6.04 M/UL
RBC # FLD: 16.4 %
SODIUM SERPL-SCNC: 139 MMOL/L
TRIGL SERPL-MCNC: 207 MG/DL
WBC # FLD AUTO: 6.27 K/UL

## 2021-06-02 PROCEDURE — 99072 ADDL SUPL MATRL&STAF TM PHE: CPT

## 2021-06-02 PROCEDURE — 99214 OFFICE O/P EST MOD 30 MIN: CPT

## 2021-06-02 NOTE — HISTORY OF PRESENT ILLNESS
[FreeTextEntry1] : \par 6/2/2021\par KAVIN HANKINS is a 56 year old male being seen for a follow-up visit. Pt doing better with Blood Pressure and following up with cardiology. Pt was on Truvada and was changed to Descovy. We discussed all recent lab results. \par plan 6/2/2021\par 1) labs today\par 2) labs in 3 months see in person in 6 months. \par 3) continue Descovy\par  \par History of Present Illness\par Reason For Visit\par KAVIN HANKINS is a 53 year old male being seen for a 2nd evaluation. Concern over blood presure 120/119. pt states taking growth hormone and other steroids for body building. Pt needs a primarycare provider. Pt states no headaches or blurred vision or body weakness. Plan. Pt needs to cut down or stop steroids !!!! needs blood pressure cuff and needs a PCP!! start low dose HCTZ 12.5 today and see in a week. labs today. EKG today See [facility] today. pt also told of Stroke or MI from steroid use/abuse. If chest pain /headache to go to emergency room. \par  \par History of Present Illness\par 52 year old male interested in PreP. Bisexual. dosesnt smoke. med Hx: none surgury: none Allergies : None. currently taking for sHGH-somatropin, trenbolone acetate, testosterone propionate and milt thistle.. plan all labs today. hold off on truvada until labs reurn. see in one month. \par \par  \par Active Problems\par Airway obstruction (519.8) (J98.8)\par Anxiety (300.00) (F41.9)\par Blood glucose elevated (790.29) (R73.9)\par Cardiomyopathy (425.4) (I42.9)\par Chronic rhinitis (472.0) (J31.0)\par Deviated nasal septum (470) (J34.2)\par Dyspnea on exertion (786.09) (R06.00)\par Elevated hemoglobin A1c (790.29) (R73.09)\par Encounter for immunization (V03.89) (Z23)\par Exposure to communicable disease (V01.9) (Z20.9)\par GERD (gastroesophageal reflux disease) (530.81) (K21.9)\par HTN (hypertension) (401.9) (I10)\par Obstructive sleep apnea on CPAP (327.23,V46.8) (G47.33,Z99.89)\par \par Past Medical History\par History of hypertension (V12.59) (Z86.79)\par \par Current Meds\par Azelastine HCl - 137 MCG/SPRAY Nasal Solution; INSERT 2 SQUIRTS IN EACH\par NOSTRIL TWICE DAILY\par Descovy 200-25 MG Oral Tablet; TAKE 1 TABLET BY MOUTH DAILY\par Escitalopram Oxalate 10 MG Oral Tablet; TAKE 1 TABLET DAILY\par Famotidine 20 MG Oral Tablet; take 1 tablet by mouth at bedtime\par Fluticasone Propionate 50 MCG/ACT Nasal Suspension; SPRAY 2 SPRAYS INTO EACH\par NOSTRIL ONCE DAILY AS DIRECTED\par Losartan Potassium 100 MG Oral Tablet; TAKE 1 TABLET BY MOUTH EVERY DAY\par Metoprolol Succinate  MG Oral Tablet Extended Release 24 Hour; TAKE 1 TABLET\par BY MOUTH ONCE DAILY\par PriLOSEC OTC 20 MG Oral Tablet Delayed Release\par \par Allergies\par No Known Drug Allergies\par \par

## 2021-06-03 ENCOUNTER — APPOINTMENT (OUTPATIENT)
Dept: CARDIOLOGY | Facility: CLINIC | Age: 56
End: 2021-06-03

## 2021-06-03 LAB
APPEARANCE: CLEAR
BACTERIA: NEGATIVE
BILIRUBIN URINE: NEGATIVE
BLOOD URINE: NEGATIVE
C TRACH RRNA SPEC QL NAA+PROBE: NOT DETECTED
COLOR: YELLOW
ESTIMATED AVERAGE GLUCOSE: 128 MG/DL
GLUCOSE QUALITATIVE U: NEGATIVE
HBA1C MFR BLD HPLC: 6.1 %
HCV AB SER QL: NONREACTIVE
HCV S/CO RATIO: 0.11 S/CO
HYALINE CASTS: 0 /LPF
KETONES URINE: NEGATIVE
LEUKOCYTE ESTERASE URINE: NEGATIVE
MICROSCOPIC-UA: NORMAL
N GONORRHOEA RRNA SPEC QL NAA+PROBE: NOT DETECTED
NITRITE URINE: NEGATIVE
PH URINE: 6.5
PROTEIN URINE: ABNORMAL
RED BLOOD CELLS URINE: 4 /HPF
SOURCE ANAL: NORMAL
SPECIFIC GRAVITY URINE: 1.03
SQUAMOUS EPITHELIAL CELLS: 0 /HPF
UROBILINOGEN URINE: NORMAL
WHITE BLOOD CELLS URINE: 1 /HPF

## 2021-06-04 LAB
C TRACH RRNA SPEC QL NAA+PROBE: NOT DETECTED
C TRACH RRNA SPEC QL NAA+PROBE: NOT DETECTED
N GONORRHOEA RRNA SPEC QL NAA+PROBE: NOT DETECTED
N GONORRHOEA RRNA SPEC QL NAA+PROBE: NOT DETECTED
SOURCE AMPLIFICATION: NORMAL
SOURCE ORAL: NORMAL

## 2021-06-21 ENCOUNTER — RX RENEWAL (OUTPATIENT)
Age: 56
End: 2021-06-21

## 2021-07-01 ENCOUNTER — APPOINTMENT (OUTPATIENT)
Dept: CARDIOLOGY | Facility: CLINIC | Age: 56
End: 2021-07-01

## 2021-11-05 ENCOUNTER — RX RENEWAL (OUTPATIENT)
Age: 56
End: 2021-11-05

## 2021-11-16 ENCOUNTER — RX RENEWAL (OUTPATIENT)
Age: 56
End: 2021-11-16

## 2021-12-01 ENCOUNTER — APPOINTMENT (OUTPATIENT)
Dept: INFECTIOUS DISEASE | Facility: CLINIC | Age: 56
End: 2021-12-01
Payer: COMMERCIAL

## 2021-12-01 VITALS
WEIGHT: 271 LBS | TEMPERATURE: 98.5 F | BODY MASS INDEX: 37.94 KG/M2 | HEIGHT: 71 IN | SYSTOLIC BLOOD PRESSURE: 193 MMHG | HEART RATE: 85 BPM | RESPIRATION RATE: 16 BRPM | DIASTOLIC BLOOD PRESSURE: 91 MMHG | OXYGEN SATURATION: 97 %

## 2021-12-01 VITALS — DIASTOLIC BLOOD PRESSURE: 84 MMHG | SYSTOLIC BLOOD PRESSURE: 162 MMHG

## 2021-12-01 PROCEDURE — 99212 OFFICE O/P EST SF 10 MIN: CPT

## 2021-12-01 NOTE — HISTORY OF PRESENT ILLNESS
[FreeTextEntry1] : 12/1/21\par KAVIN HANKINS is a 56 year old male being seen for a follow-up visit. Pt doing better with Blood Pressure and following up with cardiology. Pt was on Truvada and was changed to Descovy. We discussed all recent lab results. \par plan 12/1/2021\par 1) labs today\par 2) labs today and in 3 months see in person in 6 months. \par 3) continue Descovy\par  \par History of Present Illness\par Reason For Visit\par KAVIN HANKINS is a 53 year old male being seen for a 2nd evaluation. Concern over blood presure 120/119. pt states taking growth hormone and other steroids for body building. Pt needs a primarycare provider. Pt states no headaches or blurred vision or body weakness. Plan. Pt needs to cut down or stop steroids !!!! needs blood pressure cuff and needs a PCP!! start low dose HCTZ 12.5 today and see in a week. labs today. EKG today See Loy[facility] today. pt also told of Stroke or MI from steroid use/abuse. If chest pain /headache to go to emergency room. \par  \par History of Present Illness\par 52 year old male interested in PreP. Bisexual. dosesnt smoke. med Hx: none surgury: none Allergies : None. currently taking for sHGH-somatropin, trenbolone acetate, testosterone propionate and milt thistle.. plan all labs today. hold off on truvada until labs reurn. see in one month. \par \par  \par Active Problems\par Airway obstruction (519.8) (J98.8)\par Anxiety (300.00) (F41.9)\par Blood glucose elevated (790.29) (R73.9)\par Cardiomyopathy (425.4) (I42.9)\par Chronic rhinitis (472.0) (J31.0)\par Deviated nasal septum (470) (J34.2)\par Dyspnea on exertion (786.09) (R06.00)\par Elevated hemoglobin A1c (790.29) (R73.09)\par Encounter for immunization (V03.89) (Z23)\par Exposure to communicable disease (V01.9) (Z20.9)\par GERD (gastroesophageal reflux disease) (530.81) (K21.9)\par HTN (hypertension) (401.9) (I10)\par Obstructive sleep apnea on CPAP (327.23,V46.8) (G47.33,Z99.89)\par \par Past Medical History\par History of hypertension (V12.59) (Z86.79)\par \par Current Meds\par Azelastine HCl - 137 MCG/SPRAY Nasal Solution; INSERT 2 SQUIRTS IN EACH\par NOSTRIL TWICE DAILY\par Descovy 200-25 MG Oral Tablet; TAKE 1 TABLET BY MOUTH DAILY\par Escitalopram Oxalate 10 MG Oral Tablet; TAKE 1 TABLET DAILY\par Famotidine 20 MG Oral Tablet; take 1 tablet by mouth at bedtime\par Fluticasone Propionate 50 MCG/ACT Nasal Suspension; SPRAY 2 SPRAYS INTO EACH\par NOSTRIL ONCE DAILY AS DIRECTED\par Losartan Potassium 100 MG Oral Tablet; TAKE 1 TABLET BY MOUTH EVERY DAY\par Metoprolol Succinate  MG Oral Tablet Extended Release 24 Hour; TAKE 1 TABLET\par BY MOUTH ONCE DAILY\par PriLOSEC OTC 20 MG Oral Tablet Delayed Release\par \par Allergies\par No Known Drug Allergies\par \par

## 2021-12-02 LAB
25(OH)D3 SERPL-MCNC: 40.3 NG/ML
ALBUMIN SERPL ELPH-MCNC: 4.6 G/DL
ALP BLD-CCNC: 77 U/L
ALT SERPL-CCNC: 42 U/L
ANION GAP SERPL CALC-SCNC: 12 MMOL/L
APPEARANCE: CLEAR
AST SERPL-CCNC: 34 U/L
BACTERIA: NEGATIVE
BASOPHILS # BLD AUTO: 0.04 K/UL
BASOPHILS NFR BLD AUTO: 0.6 %
BILIRUB SERPL-MCNC: 0.7 MG/DL
BILIRUBIN URINE: NEGATIVE
BLOOD URINE: NEGATIVE
BUN SERPL-MCNC: 15 MG/DL
C TRACH RRNA SPEC QL NAA+PROBE: NOT DETECTED
C TRACH RRNA SPEC QL NAA+PROBE: NOT DETECTED
CALCIUM SERPL-MCNC: 9.7 MG/DL
CHLORIDE SERPL-SCNC: 101 MMOL/L
CO2 SERPL-SCNC: 29 MMOL/L
COLOR: YELLOW
CREAT SERPL-MCNC: 1.2 MG/DL
EOSINOPHIL # BLD AUTO: 0.1 K/UL
EOSINOPHIL NFR BLD AUTO: 1.6 %
GLUCOSE QUALITATIVE U: NEGATIVE
GLUCOSE SERPL-MCNC: 124 MG/DL
HCT VFR BLD CALC: 55.4 %
HGB BLD-MCNC: 17.2 G/DL
HIV1+2 AB SPEC QL IA.RAPID: NONREACTIVE
HYALINE CASTS: 0 /LPF
IMM GRANULOCYTES NFR BLD AUTO: 0.3 %
KETONES URINE: NEGATIVE
LEUKOCYTE ESTERASE URINE: NEGATIVE
LYMPHOCYTES # BLD AUTO: 2.08 K/UL
LYMPHOCYTES NFR BLD AUTO: 32.6 %
MAN DIFF?: NORMAL
MCHC RBC-ENTMCNC: 27.3 PG
MCHC RBC-ENTMCNC: 31 GM/DL
MCV RBC AUTO: 87.9 FL
MICROSCOPIC-UA: NORMAL
MONOCYTES # BLD AUTO: 0.66 K/UL
MONOCYTES NFR BLD AUTO: 10.3 %
N GONORRHOEA RRNA SPEC QL NAA+PROBE: NOT DETECTED
N GONORRHOEA RRNA SPEC QL NAA+PROBE: NOT DETECTED
NEUTROPHILS # BLD AUTO: 3.49 K/UL
NEUTROPHILS NFR BLD AUTO: 54.6 %
NITRITE URINE: NEGATIVE
PH URINE: 6.5
PLATELET # BLD AUTO: 240 K/UL
POTASSIUM SERPL-SCNC: 4.6 MMOL/L
PROT SERPL-MCNC: 7.3 G/DL
PROTEIN URINE: ABNORMAL
RBC # BLD: 6.3 M/UL
RBC # FLD: 15.9 %
RED BLOOD CELLS URINE: 4 /HPF
SODIUM SERPL-SCNC: 141 MMOL/L
SOURCE AMPLIFICATION: NORMAL
SOURCE ANAL: NORMAL
SPECIFIC GRAVITY URINE: 1.03
SQUAMOUS EPITHELIAL CELLS: 0 /HPF
T PALLIDUM AB SER QL IA: NEGATIVE
UROBILINOGEN URINE: NORMAL
WBC # FLD AUTO: 6.39 K/UL
WHITE BLOOD CELLS URINE: 2 /HPF

## 2021-12-03 LAB
C TRACH RRNA SPEC QL NAA+PROBE: NOT DETECTED
N GONORRHOEA RRNA SPEC QL NAA+PROBE: NOT DETECTED
SOURCE ORAL: NORMAL

## 2021-12-06 ENCOUNTER — RX RENEWAL (OUTPATIENT)
Age: 56
End: 2021-12-06

## 2021-12-14 ENCOUNTER — NON-APPOINTMENT (OUTPATIENT)
Age: 56
End: 2021-12-14

## 2021-12-14 LAB
TESTOST BND SERPL-MCNC: >50 PG/ML
TESTOSTERONE TOTAL S: >1500 NG/DL

## 2022-02-03 ENCOUNTER — RX RENEWAL (OUTPATIENT)
Age: 57
End: 2022-02-03

## 2022-02-22 ENCOUNTER — RX RENEWAL (OUTPATIENT)
Age: 57
End: 2022-02-22

## 2022-03-02 ENCOUNTER — APPOINTMENT (OUTPATIENT)
Dept: OTOLARYNGOLOGY | Facility: CLINIC | Age: 57
End: 2022-03-02
Payer: COMMERCIAL

## 2022-03-02 VITALS
BODY MASS INDEX: 37.1 KG/M2 | SYSTOLIC BLOOD PRESSURE: 172 MMHG | HEIGHT: 71 IN | DIASTOLIC BLOOD PRESSURE: 91 MMHG | WEIGHT: 265 LBS | HEART RATE: 91 BPM

## 2022-03-02 PROCEDURE — 99213 OFFICE O/P EST LOW 20 MIN: CPT

## 2022-03-02 RX ORDER — AZELASTINE HYDROCHLORIDE 137 UG/1
0.1 SPRAY, METERED NASAL
Qty: 1 | Refills: 3 | Status: DISCONTINUED | COMMUNITY
Start: 2021-11-16 | End: 2022-03-02

## 2022-03-02 RX ORDER — OMEPRAZOLE MAGNESIUM 20 MG/1
20 TABLET, DELAYED RELEASE ORAL
Refills: 0 | Status: DISCONTINUED | COMMUNITY
Start: 2021-01-27 | End: 2022-03-02

## 2022-03-02 NOTE — PHYSICAL EXAM
[] : septum deviated to the left [Midline] : trachea located in midline position [Normal] : no masses and lesions seen, face is symmetric [de-identified] : class 2 [de-identified] : mild uvula edema [FreeTextEntry2] : Sinuses nontender to percussion. Sensations intact.

## 2022-03-02 NOTE — ADDENDUM
[FreeTextEntry1] : Documented by Jimmie Adan acting as scribe for Dr. Boyd on 03/02/2022.\par \par All Medical record entries made by the Scribe were at my, Dr. Boyd, direction and personally dictated by me on 03/02/2022. I have reviewed the chart and agree that the record accurately reflects my personal performance of the history, physical exam, assessment and plan. I have also personally directed, reviewed, and agreed with the discharge instructions.

## 2022-03-02 NOTE — HISTORY OF PRESENT ILLNESS
[de-identified] : The patient presents with h/o chronic rhinitis- on Astelin and Flonase prn, sleep apnea - uses CPAP machine, reflux- on  Pepcid. Pt presents today for follow up on reflux and medication refill. Pt reports that he has had intermittent hiccups and throat discomfort since running out of Pepcid.

## 2022-03-02 NOTE — ASSESSMENT
[FreeTextEntry1] : Reviewed and reconciled medications, allergies, PMHx, PSHx, SocHx, FMHx. \par \par h/o chronic rhinitis- on Astelin and Flonase prn, sleep apnea - uses CPAP machine, reflux- on  Pepcid\par throat discomfort\par intermittent hiccups\par \par Plan:\par Refilled Famotidine. Continue nasal sprays prn. Reflux diet sheet provided. FU 1 year, sooner if sx continue despite restarting Famotidine. Can double up on Pepcid if eating something that will upset his reflux.

## 2022-03-02 NOTE — REASON FOR VISIT
[Subsequent Evaluation] : a subsequent evaluation for [FreeTextEntry2] : Follow up/ medication refills

## 2022-03-11 ENCOUNTER — NON-APPOINTMENT (OUTPATIENT)
Age: 57
End: 2022-03-11

## 2022-03-11 ENCOUNTER — APPOINTMENT (OUTPATIENT)
Dept: CARDIOLOGY | Facility: CLINIC | Age: 57
End: 2022-03-11
Payer: COMMERCIAL

## 2022-03-11 VITALS
HEART RATE: 77 BPM | BODY MASS INDEX: 37.1 KG/M2 | SYSTOLIC BLOOD PRESSURE: 190 MMHG | HEIGHT: 71 IN | WEIGHT: 265 LBS | DIASTOLIC BLOOD PRESSURE: 90 MMHG | OXYGEN SATURATION: 97 %

## 2022-03-11 PROCEDURE — 93000 ELECTROCARDIOGRAM COMPLETE: CPT

## 2022-03-11 PROCEDURE — 99214 OFFICE O/P EST MOD 30 MIN: CPT

## 2022-03-18 ENCOUNTER — APPOINTMENT (OUTPATIENT)
Dept: CARDIOLOGY | Facility: CLINIC | Age: 57
End: 2022-03-18
Payer: COMMERCIAL

## 2022-03-18 PROCEDURE — 93306 TTE W/DOPPLER COMPLETE: CPT

## 2022-04-04 ENCOUNTER — NON-APPOINTMENT (OUTPATIENT)
Age: 57
End: 2022-04-04

## 2022-04-06 ENCOUNTER — NON-APPOINTMENT (OUTPATIENT)
Age: 57
End: 2022-04-06

## 2022-04-25 ENCOUNTER — RX RENEWAL (OUTPATIENT)
Age: 57
End: 2022-04-25

## 2022-05-09 ENCOUNTER — RX RENEWAL (OUTPATIENT)
Age: 57
End: 2022-05-09

## 2022-08-18 ENCOUNTER — RX RENEWAL (OUTPATIENT)
Age: 57
End: 2022-08-18

## 2022-12-02 ENCOUNTER — RX RENEWAL (OUTPATIENT)
Age: 57
End: 2022-12-02

## 2022-12-16 ENCOUNTER — APPOINTMENT (OUTPATIENT)
Dept: INFECTIOUS DISEASE | Facility: CLINIC | Age: 57
End: 2022-12-16

## 2022-12-16 LAB
BASOPHILS # BLD AUTO: 0.03 K/UL
BASOPHILS NFR BLD AUTO: 0.4 %
EOSINOPHIL # BLD AUTO: 0.08 K/UL
EOSINOPHIL NFR BLD AUTO: 1.2 %
HCT VFR BLD CALC: 51.2 %
HGB BLD-MCNC: 17 G/DL
IMM GRANULOCYTES NFR BLD AUTO: 0.3 %
LYMPHOCYTES # BLD AUTO: 1.76 K/UL
LYMPHOCYTES NFR BLD AUTO: 26 %
MAN DIFF?: NORMAL
MCHC RBC-ENTMCNC: 30.2 PG
MCHC RBC-ENTMCNC: 33.2 GM/DL
MCV RBC AUTO: 90.9 FL
MONOCYTES # BLD AUTO: 0.5 K/UL
MONOCYTES NFR BLD AUTO: 7.4 %
NEUTROPHILS # BLD AUTO: 4.38 K/UL
NEUTROPHILS NFR BLD AUTO: 64.7 %
PLATELET # BLD AUTO: 240 K/UL
PSA SERPL-MCNC: 0.7 NG/ML
RBC # BLD: 5.63 M/UL
RBC # FLD: 13.4 %
WBC # FLD AUTO: 6.77 K/UL

## 2022-12-19 LAB
25(OH)D3 SERPL-MCNC: 24.1 NG/ML
ALBUMIN SERPL ELPH-MCNC: 4.5 G/DL
ALP BLD-CCNC: 63 U/L
ALT SERPL-CCNC: 29 U/L
ANION GAP SERPL CALC-SCNC: 14 MMOL/L
APPEARANCE: CLEAR
AST SERPL-CCNC: 24 U/L
BACTERIA: NEGATIVE
BILIRUB SERPL-MCNC: 0.6 MG/DL
BILIRUBIN URINE: NEGATIVE
BLOOD URINE: NEGATIVE
BUN SERPL-MCNC: 15 MG/DL
C TRACH RRNA SPEC QL NAA+PROBE: NOT DETECTED
CALCIUM SERPL-MCNC: 9.6 MG/DL
CHLORIDE SERPL-SCNC: 101 MMOL/L
CO2 SERPL-SCNC: 29 MMOL/L
COLOR: YELLOW
CREAT SERPL-MCNC: 1.18 MG/DL
EGFR: 72 ML/MIN/1.73M2
GLUCOSE QUALITATIVE U: NEGATIVE
GLUCOSE SERPL-MCNC: 183 MG/DL
HCV AB SER QL: NONREACTIVE
HCV S/CO RATIO: 0.09 S/CO
HIV1+2 AB SPEC QL IA.RAPID: NONREACTIVE
HYALINE CASTS: 2 /LPF
KETONES URINE: NORMAL
LEUKOCYTE ESTERASE URINE: NEGATIVE
MICROSCOPIC-UA: NORMAL
N GONORRHOEA RRNA SPEC QL NAA+PROBE: NOT DETECTED
NITRITE URINE: NEGATIVE
PH URINE: 6.5
POTASSIUM SERPL-SCNC: 4.8 MMOL/L
PROT SERPL-MCNC: 7 G/DL
PROTEIN URINE: ABNORMAL
RED BLOOD CELLS URINE: 1 /HPF
SODIUM SERPL-SCNC: 145 MMOL/L
SOURCE AMPLIFICATION: NORMAL
SOURCE ANAL: NORMAL
SOURCE ORAL: NORMAL
SPECIFIC GRAVITY URINE: 1.02
SQUAMOUS EPITHELIAL CELLS: 0 /HPF
T PALLIDUM AB SER QL IA: NEGATIVE
UROBILINOGEN URINE: ABNORMAL
WHITE BLOOD CELLS URINE: 1 /HPF

## 2022-12-21 LAB — HPV DNA, HIGH RISK, LOW RISK, ANAL: NOT DETECTED

## 2022-12-22 ENCOUNTER — NON-APPOINTMENT (OUTPATIENT)
Age: 57
End: 2022-12-22

## 2022-12-22 LAB — ANAL PAP CYTOLOGY: NORMAL

## 2023-01-12 ENCOUNTER — APPOINTMENT (OUTPATIENT)
Dept: CARDIOLOGY | Facility: CLINIC | Age: 58
End: 2023-01-12
Payer: COMMERCIAL

## 2023-01-12 ENCOUNTER — NON-APPOINTMENT (OUTPATIENT)
Age: 58
End: 2023-01-12

## 2023-01-12 VITALS — OXYGEN SATURATION: 98 % | DIASTOLIC BLOOD PRESSURE: 80 MMHG | SYSTOLIC BLOOD PRESSURE: 192 MMHG | HEART RATE: 80 BPM

## 2023-01-12 VITALS — DIASTOLIC BLOOD PRESSURE: 92 MMHG | SYSTOLIC BLOOD PRESSURE: 158 MMHG

## 2023-01-12 PROCEDURE — 93000 ELECTROCARDIOGRAM COMPLETE: CPT

## 2023-01-12 PROCEDURE — 99214 OFFICE O/P EST MOD 30 MIN: CPT | Mod: 25

## 2023-01-13 LAB
ALBUMIN SERPL ELPH-MCNC: 4.5 G/DL
ALP BLD-CCNC: 67 U/L
ALT SERPL-CCNC: 36 U/L
ANION GAP SERPL CALC-SCNC: 13 MMOL/L
AST SERPL-CCNC: 27 U/L
BILIRUB SERPL-MCNC: 0.6 MG/DL
BUN SERPL-MCNC: 12 MG/DL
CALCIUM SERPL-MCNC: 9.6 MG/DL
CHLORIDE SERPL-SCNC: 100 MMOL/L
CHOLEST SERPL-MCNC: 158 MG/DL
CO2 SERPL-SCNC: 29 MMOL/L
CREAT SERPL-MCNC: 1.14 MG/DL
EGFR: 75 ML/MIN/1.73M2
ESTIMATED AVERAGE GLUCOSE: 137 MG/DL
GLUCOSE SERPL-MCNC: 128 MG/DL
HBA1C MFR BLD HPLC: 6.4 %
HDLC SERPL-MCNC: 24 MG/DL
LDLC SERPL CALC-MCNC: 86 MG/DL
NONHDLC SERPL-MCNC: 134 MG/DL
POTASSIUM SERPL-SCNC: 4.5 MMOL/L
PROT SERPL-MCNC: 7 G/DL
SODIUM SERPL-SCNC: 142 MMOL/L
TRIGL SERPL-MCNC: 236 MG/DL

## 2023-02-03 ENCOUNTER — NON-APPOINTMENT (OUTPATIENT)
Age: 58
End: 2023-02-03

## 2023-02-15 ENCOUNTER — APPOINTMENT (OUTPATIENT)
Dept: OTOLARYNGOLOGY | Facility: CLINIC | Age: 58
End: 2023-02-15
Payer: COMMERCIAL

## 2023-02-15 ENCOUNTER — NON-APPOINTMENT (OUTPATIENT)
Age: 58
End: 2023-02-15

## 2023-02-15 VITALS
HEIGHT: 71 IN | SYSTOLIC BLOOD PRESSURE: 123 MMHG | DIASTOLIC BLOOD PRESSURE: 77 MMHG | BODY MASS INDEX: 35.7 KG/M2 | HEART RATE: 64 BPM | WEIGHT: 255 LBS

## 2023-02-15 DIAGNOSIS — J34.2 DEVIATED NASAL SEPTUM: ICD-10-CM

## 2023-02-15 DIAGNOSIS — J38.7 OTHER DISEASES OF LARYNX: ICD-10-CM

## 2023-02-15 PROCEDURE — 31575 DIAGNOSTIC LARYNGOSCOPY: CPT

## 2023-02-15 PROCEDURE — 99214 OFFICE O/P EST MOD 30 MIN: CPT | Mod: 25

## 2023-02-15 NOTE — PROCEDURE
[de-identified] : Procedure: Flexible Fiberoptic Laryngoscopy: Risks, benefits, and alternatives of flexible laryngoscopy were explained to the patient. The patient gave oral consent to proceed. The flexible scope was inserted into the right nasal cavity and advanced towards the nasopharynx. Visualized mucosa over the turbinates and septum were as described as above. The nasopharynx was clear. Oropharyngeal walls were symmetric and mobile without lesion, mass, or edema. Hypopharynx was also without lesion or edema. Larynx was mobile without lesions. Narrowing, no obstruction of soft palate. Developing granulomas on posterior pharynx, Edema of postcricoid, arytenoids and interarytenoids. Base of tongue was within normal limits.

## 2023-02-15 NOTE — HISTORY OF PRESENT ILLNESS
[de-identified] : Pt presents with h/o chronic rhinitis- on Astelin and Flonase prn, sleep apnea - uses AutoPAP machine with nasal pillows, reflux- on Pepcid. Pt presents today for follow up on reflux and medication refill. Pt note he still uses Astelin and Flonase and Famotidine and following reflux diet. Pt notes he has been doing good. Pt notes he has been using his AutoPAP machine. Pt notes he got a call from the AutoPAP company and they haven't gotten a report since the beginning of January and he is waiting for a new machine since his is recalled. Pt notes he has had his machine for 5 years. Pt notes he didn't use the machine some nights because he was nervous about the recall. Pt notes he has been paying for some of his AutoPAP machine supplies. Pt notes he has gotten an endoscopy done about 3 years ago, which was normal. Pt denies feeling heartburn, denies indigestion.

## 2023-02-15 NOTE — ASSESSMENT
[FreeTextEntry1] : Reviewed and reconciled medications, allergies, PMHx, PSHx, SocHx, FMHx.\par \par Pt presents with h/o chronic rhinitis- on Astelin and Flonase prn, sleep apnea - uses AutoPAP machine, reflux- on Pepcid. Pt presents today for follow up on reflux and medication refill. Pt note he still uses Astelin and Flonase and Famotidine and following reflux diet. Pt notes he has been doing good. Pt notes he has been using his AutoPAP machine. \par \par Physical Exam -\par mildly deviated septum right, mildly inflamed turbs \par crowded, mild uvular edema, type 2 oral cavity, webbing between uvula and posterior pharyngeal wall, tonsils class 2\par \par Compliance Report 10/4-1/1\par 49% usage, AHI 10.4, average usage for days used 5 hours and 35 minutes\par \par Flexible laryngoscopy \par clear nasopharynx, narrowing, no obstruction of soft palate, developing granulomas on posterior pharynx, Edema of postcricoid, arytenoids and interarytenoids\par \par Plan:\par Flexible laryngoscopy. Reviewed compliance report. Astelin, Flonase, and Pepcid refilled. FEEST ordered. New AutoPAP machine requested. FU after FEEST.

## 2023-02-15 NOTE — PHYSICAL EXAM
[Hearing Olguin Test (Tuning Fork On Forehead)] : no lateralization of tone [Midline] : trachea located in midline position [Normal] : orientation to person, place, and time: normal [de-identified] : mildly deviated septum right [de-identified] : mildly inflamed turbs  [de-identified] : class 2 [de-identified] : crowded, mild uvular edema, type 2 oral cavity, webbing between uvula and posterior pharyngeal wall [FreeTextEntry2] : sinuses nontender to percussion.

## 2023-02-15 NOTE — ADDENDUM
[FreeTextEntry1] : Documented by Trisha Archuleta acting as scribe for Dr. Boyd on 02/15/2023.\par \par All Medical record entries made by the scribe were at my, Dr. Boyd,direction and personally dictated by me on 02/15/2023. I have reviewed the chart and agree that the record accurately reflects my personal performance of the history, physical exam, assessment and plan. I have also personally directed, reviewed, and agreed with the discharge instructions.

## 2023-03-07 ENCOUNTER — APPOINTMENT (OUTPATIENT)
Dept: OTOLARYNGOLOGY | Facility: CLINIC | Age: 58
End: 2023-03-07
Payer: COMMERCIAL

## 2023-03-07 PROCEDURE — 92612 ENDOSCOPY SWALLOW (FEES) VID: CPT | Mod: GN

## 2023-03-07 NOTE — ASSESSMENT
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 Rob Boyd\par Date of onset: ongoing\par \par Reason for Referral: Information on this patient has been provided by the patient and via chart review. Patient is a 57 year old male who participated in a FEES to rule out silent penetration/aspiration, determine the appropriateness of oral diet and to further investigate findings found on prior flexible laryngoscopy. Patient reported he is unaware of reason for this exam, upon probing. He declines dysphagia symptoms to solids/liquids, denies unintentional weight loss and/or repeated/recent PNA. He notes excessive nasal congestion and rhinorrhea. Vocal quality changes, throat clearing and excessive saliva were declined. \par \par Medical History, as per charting:\par Active Problems\par Airway obstruction (519.8) (J98.8)\par Anxiety (300.00) (F41.9)\par Blood glucose elevated (790.29) (R73.9)\par Cardiomyopathy (425.4) (I42.9)\par Chronic rhinitis (472.0) (J31.0)\par Deviated nasal septum (470) (J34.2)\par Dyspnea on exertion (786.09) (R06.09)\par Elevated hemoglobin A1c (790.29) (R73.09)\par Encounter for immunization (V03.89) (Z23)\par Exposure to communicable disease (V01.9) (Z20.9)\par GERD (gastroesophageal reflux disease) (530.81) (K21.9)\par HTN (hypertension) (401.9) (I10)\par Obstructive sleep apnea on CPAP (327.23,V46.8) (G47.33,Z99.89)\par \par Current Nutritional Intake: Regular solids and Thin liquids, as reported by patient\par Current Respiratory Status: Room air\par \par ASSESSMENT:\par Consistencies Administered:\par 1. Puree via teaspoon\par 2. Regular solids, self-fed\par 3. Thin fluids via open cup\par \par FEES PROCEDURE:\par The patient was explained the FEES procedure, and consent was obtained. A nasendoscope was passed via the right nasal passage and positioned above the supraglottic structures. The epiglottis, aryepiglottic folds, arytenoids, and true vocal folds were clearly visible with mild erythema of the arytenoids, interarytenoids and post cricoid space. Assessment of the TVF abduction and adduction revealed bilateral movement with full closure during phonation. The patient exhibited an erythemic and edatemous process along the posterior pharyngeal wall, consistent with prior laryngeal exam dated 2/15/23.\par \par The patient was presented with PO trials of pureed, regular solid and thin liquid textures impregnated in green dye. In regard to swallow function, there was timely and complete tongue base propulsion/retraction, pharyngeal swallow trigger and epiglottic deflection without any evidence of premature spillover, laryngeal penetration or aspiration noted before or after the swallow across all PO trials. There was intermittent trace stasis located along the posterior pharyngeal wall which fully cleared upon liquid wash. Of note, there was also intermittent return of (trace) green-tinged material along the cricopharyngeus, consistent with active laryngopharyngeal reflux (LPR). At this point testing was discontinued and the scope was carefully removed, with patient tolerating the entire procedure without difficulty. \par \par Aspiration - Penetration Scale: 1 - Pureed; Regular solid; Thin liquid \par \par Aspiration - Penetration Scale (Eulabek et al Dysphagia 11:93-98 (1996), Aspiration-Penetration Scale)\par 1. Material does not enter the airway\par 2. Material enters the airway, remains above the vocal folds, and is ejected from the airway\par 3. Material enters the airway, remains above the vocal folds, and is not ejected\par 4. Material enters the airway, contacts the vocal folds, and is ejected from the airway\par 5. Material enters the airway, contacts the vocal folds, and is not ejected from the airway\par 6. Material enters the airway, passes below the vocal folds and is ejected into the larynx or out of the airway\par 7. Material enters the airway, passes below the vocal folds, and is not ejected from the trachea despite effort\par 8. Material enters the airway, passes below the vocal folds, and no effort is made to eject\par \par Impressions: 1) Intact pharyngeal stage of swallow; 2) Laryngopharyngeal reflux\par \par Prognosis: Good for recommended diet\par \par Recommendations:\par 1) Continue a Regular Solid / Thin Liquid diet\par 2) Maintain Reflux Precautions, as briefly discussed with patient upon completion of today's evaluation. A written educational handout was also provided re: diet / lifestyle modifications\par 3) Swallow therapy is not indicated at this time given grossly intact pharyngeal swallow function as described above\par 4) Follow up with referring MD as directed\par \par Education: The above recommendations were discussed with the patient.\par \par Should you have additional questions/concerns, please contact this office at [phone].\par \par Sydni Parsons M.A., CCC-SLP\par Speech-Language Pathologist

## 2023-03-08 ENCOUNTER — NON-APPOINTMENT (OUTPATIENT)
Age: 58
End: 2023-03-08

## 2023-03-10 LAB
ALBUMIN SERPL ELPH-MCNC: 4.8 G/DL
ALP BLD-CCNC: 88 U/L
ALT SERPL-CCNC: 36 U/L
ANION GAP SERPL CALC-SCNC: 16 MMOL/L
AST SERPL-CCNC: 30 U/L
BILIRUB SERPL-MCNC: 0.8 MG/DL
BUN SERPL-MCNC: 17 MG/DL
CALCIUM SERPL-MCNC: 9.8 MG/DL
CHLORIDE SERPL-SCNC: 102 MMOL/L
CO2 SERPL-SCNC: 25 MMOL/L
CREAT SERPL-MCNC: 1.32 MG/DL
EGFR: 63 ML/MIN/1.73M2
ESTIMATED AVERAGE GLUCOSE: 126 MG/DL
GLUCOSE SERPL-MCNC: 109 MG/DL
HBA1C MFR BLD HPLC: 6 %
POTASSIUM SERPL-SCNC: 5 MMOL/L
PROT SERPL-MCNC: 7.3 G/DL
SODIUM SERPL-SCNC: 142 MMOL/L

## 2023-03-13 ENCOUNTER — RX RENEWAL (OUTPATIENT)
Age: 58
End: 2023-03-13

## 2023-03-13 DIAGNOSIS — Z79.2 LONG TERM (CURRENT) USE OF ANTIBIOTICS: ICD-10-CM

## 2023-03-13 RX ORDER — EMTRICITABINE AND TENOFOVIR ALAFENAMIDE 200; 25 MG/1; MG/1
200-25 TABLET ORAL
Qty: 30 | Refills: 5 | Status: ACTIVE | COMMUNITY
Start: 2019-12-13 | End: 1900-01-01

## 2023-04-07 ENCOUNTER — APPOINTMENT (OUTPATIENT)
Dept: OTOLARYNGOLOGY | Facility: CLINIC | Age: 58
End: 2023-04-07

## 2023-05-02 ENCOUNTER — APPOINTMENT (OUTPATIENT)
Dept: CARDIOLOGY | Facility: CLINIC | Age: 58
End: 2023-05-02

## 2023-05-23 ENCOUNTER — RX RENEWAL (OUTPATIENT)
Age: 58
End: 2023-05-23

## 2023-07-17 ENCOUNTER — NON-APPOINTMENT (OUTPATIENT)
Age: 58
End: 2023-07-17

## 2023-07-18 ENCOUNTER — RX RENEWAL (OUTPATIENT)
Age: 58
End: 2023-07-18

## 2023-07-20 ENCOUNTER — APPOINTMENT (OUTPATIENT)
Dept: INFECTIOUS DISEASE | Facility: CLINIC | Age: 58
End: 2023-07-20

## 2023-08-08 ENCOUNTER — RX RENEWAL (OUTPATIENT)
Age: 58
End: 2023-08-08

## 2023-09-13 ENCOUNTER — APPOINTMENT (OUTPATIENT)
Dept: INFECTIOUS DISEASE | Facility: CLINIC | Age: 58
End: 2023-09-13
Payer: COMMERCIAL

## 2023-09-13 VITALS
HEART RATE: 76 BPM | DIASTOLIC BLOOD PRESSURE: 87 MMHG | BODY MASS INDEX: 33.88 KG/M2 | WEIGHT: 242 LBS | OXYGEN SATURATION: 98 % | SYSTOLIC BLOOD PRESSURE: 130 MMHG | TEMPERATURE: 97.8 F | HEIGHT: 71 IN

## 2023-09-13 LAB
25(OH)D3 SERPL-MCNC: 53.8 NG/ML
ALBUMIN SERPL ELPH-MCNC: 4.8 G/DL
ALP BLD-CCNC: 55 U/L
ALT SERPL-CCNC: 34 U/L
ANION GAP SERPL CALC-SCNC: 14 MMOL/L
AST SERPL-CCNC: 36 U/L
BILIRUB SERPL-MCNC: 1 MG/DL
BUN SERPL-MCNC: 16 MG/DL
CALCIUM SERPL-MCNC: 9.8 MG/DL
CHLORIDE SERPL-SCNC: 101 MMOL/L
CHOLEST SERPL-MCNC: 181 MG/DL
CO2 SERPL-SCNC: 26 MMOL/L
CREAT SERPL-MCNC: 1.15 MG/DL
EGFR: 74 ML/MIN/1.73M2
GLUCOSE SERPL-MCNC: 107 MG/DL
HCT VFR BLD CALC: 50.7 %
HDLC SERPL-MCNC: 23 MG/DL
HGB BLD-MCNC: 16.6 G/DL
LDLC SERPL CALC-MCNC: 139 MG/DL
MCHC RBC-ENTMCNC: 29.5 PG
MCHC RBC-ENTMCNC: 32.7 GM/DL
MCV RBC AUTO: 90.2 FL
NONHDLC SERPL-MCNC: 158 MG/DL
PLATELET # BLD AUTO: 241 K/UL
POTASSIUM SERPL-SCNC: 4.8 MMOL/L
PROT SERPL-MCNC: 7.5 G/DL
PSA SERPL-MCNC: 0.49 NG/ML
RBC # BLD: 5.62 M/UL
RBC # FLD: 13.6 %
SODIUM SERPL-SCNC: 142 MMOL/L
TRIGL SERPL-MCNC: 102 MG/DL
WBC # FLD AUTO: 5.6 K/UL

## 2023-09-13 PROCEDURE — 99214 OFFICE O/P EST MOD 30 MIN: CPT

## 2023-09-15 LAB
APPEARANCE: CLEAR
BACTERIA: NEGATIVE /HPF
BILIRUBIN URINE: NEGATIVE
BLOOD URINE: NEGATIVE
C TRACH RRNA SPEC QL NAA+PROBE: NOT DETECTED
CAST: 0 /LPF
COLOR: NORMAL
EPITHELIAL CELLS: 0 /HPF
ESTIMATED AVERAGE GLUCOSE: 126 MG/DL
GLUCOSE QUALITATIVE U: NEGATIVE MG/DL
HBA1C MFR BLD HPLC: 6 %
HCV AB SER QL: NONREACTIVE
HCV S/CO RATIO: 0.07 S/CO
HIV1+2 AB SPEC QL IA.RAPID: NONREACTIVE
KETONES URINE: ABNORMAL MG/DL
LEUKOCYTE ESTERASE URINE: ABNORMAL
MICROSCOPIC-UA: NORMAL
N GONORRHOEA RRNA SPEC QL NAA+PROBE: NOT DETECTED
NITRITE URINE: NEGATIVE
PH URINE: 7
PROTEIN URINE: 100 MG/DL
RED BLOOD CELLS URINE: 8 /HPF
REVIEW: NORMAL
SOURCE AMPLIFICATION: NORMAL
SOURCE ANAL: NORMAL
SOURCE ORAL: NORMAL
SPECIFIC GRAVITY URINE: 1.03
T PALLIDUM AB SER QL IA: NEGATIVE
TESTOST FREE SERPL-MCNC: 1.4 PG/ML
TESTOST SERPL-MCNC: 52.8 NG/DL
UROBILINOGEN URINE: 1 MG/DL
WHITE BLOOD CELLS URINE: 0 /HPF

## 2023-09-29 ENCOUNTER — APPOINTMENT (OUTPATIENT)
Dept: CARDIOLOGY | Facility: CLINIC | Age: 58
End: 2023-09-29

## 2023-10-18 ENCOUNTER — APPOINTMENT (OUTPATIENT)
Dept: CARDIOLOGY | Facility: CLINIC | Age: 58
End: 2023-10-18
Payer: COMMERCIAL

## 2023-10-18 ENCOUNTER — NON-APPOINTMENT (OUTPATIENT)
Age: 58
End: 2023-10-18

## 2023-10-18 VITALS
SYSTOLIC BLOOD PRESSURE: 168 MMHG | BODY MASS INDEX: 34.44 KG/M2 | OXYGEN SATURATION: 99 % | DIASTOLIC BLOOD PRESSURE: 77 MMHG | HEART RATE: 64 BPM | HEIGHT: 71 IN | WEIGHT: 246 LBS

## 2023-10-18 VITALS — DIASTOLIC BLOOD PRESSURE: 80 MMHG | SYSTOLIC BLOOD PRESSURE: 158 MMHG

## 2023-10-18 DIAGNOSIS — I42.9 CARDIOMYOPATHY, UNSPECIFIED: ICD-10-CM

## 2023-10-18 PROCEDURE — 99214 OFFICE O/P EST MOD 30 MIN: CPT | Mod: 25

## 2023-10-18 PROCEDURE — 93000 ELECTROCARDIOGRAM COMPLETE: CPT

## 2023-12-14 ENCOUNTER — RX RENEWAL (OUTPATIENT)
Age: 58
End: 2023-12-14

## 2023-12-18 ENCOUNTER — RX RENEWAL (OUTPATIENT)
Age: 58
End: 2023-12-18

## 2023-12-22 ENCOUNTER — APPOINTMENT (OUTPATIENT)
Dept: OTOLARYNGOLOGY | Facility: CLINIC | Age: 58
End: 2023-12-22

## 2024-01-04 ENCOUNTER — APPOINTMENT (OUTPATIENT)
Dept: OTOLARYNGOLOGY | Facility: CLINIC | Age: 59
End: 2024-01-04
Payer: COMMERCIAL

## 2024-01-04 VITALS
WEIGHT: 250 LBS | HEART RATE: 51 BPM | BODY MASS INDEX: 35 KG/M2 | HEIGHT: 71 IN | DIASTOLIC BLOOD PRESSURE: 90 MMHG | SYSTOLIC BLOOD PRESSURE: 176 MMHG

## 2024-01-04 DIAGNOSIS — K21.9 GASTRO-ESOPHAGEAL REFLUX DISEASE W/OUT ESOPHAGITIS: ICD-10-CM

## 2024-01-04 PROCEDURE — 99213 OFFICE O/P EST LOW 20 MIN: CPT | Mod: 25

## 2024-01-04 PROCEDURE — 31575 DIAGNOSTIC LARYNGOSCOPY: CPT

## 2024-01-04 NOTE — HISTORY OF PRESENT ILLNESS
[de-identified] : Cinthia Livingston is a 59 yo male with hx GERD, ADAIR on auto-pap, chronic rhinitis who presents for evaluation of hiccups for the past three weeks. His PCP gave him clonazepam for this which helped but hiccups recur. He denies dysphagia, odynophagia, heartburn, food regurgitation, aspiration episodes, dyspnea, dysphonia. He denies fevers or chills. He had previous FEES showing laryngopharyngeal reflux.

## 2024-01-04 NOTE — REASON FOR VISIT
[Subsequent Evaluation] : a subsequent evaluation for [FreeTextEntry2] : Hiccups for almost 3 weeks

## 2024-01-04 NOTE — ASSESSMENT
[FreeTextEntry1] : Reese Livingston presents for evaluation of hiccups for the past three weeks. HE has history of GERD, ADAIR, chronic rhinitis. Flexible laryngoscopy was performed showing significant postcricoid inflammation. Suspect hiccups are secondary to exacerbation of laryngopharyngeal reflux .He is on famotidine. Will add omeprazole.  - Continue famotidine qhs. - Start omeprazole 20 mg qd x 1 mo. - Start antireflux lifestyle and dietary modifications - handout provided. - Follow up in 1 mo with Dr. Boyd.

## 2024-02-20 ENCOUNTER — RX RENEWAL (OUTPATIENT)
Age: 59
End: 2024-02-20

## 2024-02-20 RX ORDER — METOPROLOL SUCCINATE 200 MG/1
200 TABLET, EXTENDED RELEASE ORAL DAILY
Qty: 90 | Refills: 1 | Status: ACTIVE | COMMUNITY
Start: 2018-07-19 | End: 1900-01-01

## 2024-02-20 RX ORDER — FAMOTIDINE 20 MG/1
20 TABLET, FILM COATED ORAL
Qty: 90 | Refills: 3 | Status: ACTIVE | COMMUNITY
Start: 2021-01-27 | End: 1900-01-01

## 2024-02-20 RX ORDER — LOSARTAN POTASSIUM 100 MG/1
100 TABLET, FILM COATED ORAL DAILY
Qty: 90 | Refills: 2 | Status: ACTIVE | COMMUNITY
Start: 2019-04-04 | End: 1900-01-01

## 2024-03-04 ENCOUNTER — RX RENEWAL (OUTPATIENT)
Age: 59
End: 2024-03-04

## 2024-03-11 ENCOUNTER — APPOINTMENT (OUTPATIENT)
Dept: OTOLARYNGOLOGY | Facility: CLINIC | Age: 59
End: 2024-03-11

## 2024-03-19 ENCOUNTER — TRANSCRIPTION ENCOUNTER (OUTPATIENT)
Age: 59
End: 2024-03-19

## 2024-04-01 ENCOUNTER — RX RENEWAL (OUTPATIENT)
Age: 59
End: 2024-04-01

## 2024-04-08 ENCOUNTER — NON-APPOINTMENT (OUTPATIENT)
Age: 59
End: 2024-04-08

## 2024-04-08 ENCOUNTER — APPOINTMENT (OUTPATIENT)
Dept: INFECTIOUS DISEASE | Facility: CLINIC | Age: 59
End: 2024-04-08

## 2024-04-08 DIAGNOSIS — Z20.9 CONTACT WITH AND (SUSPECTED) EXPOSURE TO UNSPECIFIED COMMUNICABLE DISEASE: ICD-10-CM

## 2024-04-09 ENCOUNTER — APPOINTMENT (OUTPATIENT)
Dept: OTOLARYNGOLOGY | Facility: CLINIC | Age: 59
End: 2024-04-09
Payer: COMMERCIAL

## 2024-04-09 VITALS
SYSTOLIC BLOOD PRESSURE: 180 MMHG | BODY MASS INDEX: 34.3 KG/M2 | DIASTOLIC BLOOD PRESSURE: 85 MMHG | HEART RATE: 73 BPM | WEIGHT: 245 LBS | HEIGHT: 71 IN

## 2024-04-09 DIAGNOSIS — J38.4 EDEMA OF LARYNX: ICD-10-CM

## 2024-04-09 DIAGNOSIS — J31.0 CHRONIC RHINITIS: ICD-10-CM

## 2024-04-09 DIAGNOSIS — H61.22 IMPACTED CERUMEN, LEFT EAR: ICD-10-CM

## 2024-04-09 DIAGNOSIS — R14.2 ERUCTATION: ICD-10-CM

## 2024-04-09 LAB
C TRACH RRNA SPEC QL NAA+PROBE: NOT DETECTED
HIV1+2 AB SPEC QL IA.RAPID: NONREACTIVE
N GONORRHOEA RRNA SPEC QL NAA+PROBE: NOT DETECTED
SOURCE AMPLIFICATION: NORMAL
SOURCE ANAL: NORMAL
SOURCE ORAL: NORMAL
T PALLIDUM AB SER QL IA: NEGATIVE

## 2024-04-09 PROCEDURE — 31575 DIAGNOSTIC LARYNGOSCOPY: CPT | Mod: 59

## 2024-04-09 PROCEDURE — 99214 OFFICE O/P EST MOD 30 MIN: CPT | Mod: 25

## 2024-04-09 PROCEDURE — 69210 REMOVE IMPACTED EAR WAX UNI: CPT

## 2024-04-09 RX ORDER — AZELASTINE HYDROCHLORIDE 137 UG/1
137 SPRAY, METERED NASAL TWICE DAILY
Qty: 3 | Refills: 3 | Status: ACTIVE | COMMUNITY
Start: 2021-01-27 | End: 1900-01-01

## 2024-04-09 RX ORDER — FLUTICASONE PROPIONATE 50 UG/1
50 SPRAY, METERED NASAL
Qty: 48 | Refills: 3 | Status: ACTIVE | COMMUNITY
Start: 2021-01-27 | End: 1900-01-01

## 2024-04-09 NOTE — ASSESSMENT
[FreeTextEntry1] : Reviewed and reconciled medications, allergies, PMHx, PSHx, SocHx, FMHx.  Patient with h/o GERD, ADAIR on auto-pap with nasal pillows, recurrent hiccups, and chronic rhinitis presents today stating that he has had recurrent hiccups/belching for the past 4-5 months. He states that it feels like he has air pockets in his throat. He states that he has been using Gaviscon and Chlorpromazine, which has given him some improvement.   Physical exam: -right ear canal: cerumen removed via curettage. cerumen removal did not trigger Arnold's nerve -left ear canal: cerumen impaction removed via curettage. cerumen removal did not trigger Arnold's nerve -minimally deviated septum R>L -mildly inflamed turbinates bilaterally -large, floppy uvula -type 3 oral cavity -class 2 tonsils  ENT Procedure: Flexible laryngoscopy -laryngeal edema -contact ulcers on both arytenoids -crowded airway -no pooling -no obvious regurgitation   Plan: -Continue Astelin - 2 sprays bilaterally BID, spray laterally -Continue Flonase - 2 sprays bilaterally QD, spray laterally -Stop Chlorpromazine and Start Metoclopramide- 2 pills 3 times day -Ordered modified barium swallow -FU with results from modified barium swallow

## 2024-04-09 NOTE — PROCEDURE
[Complicated Symptoms] : complicated symptoms requiring more thorough examination than provided by mirror [de-identified] :  Procedure: Flexible Fiberoptic Laryngoscopy: Risks, benefits, and alternatives of flexible endoscopy were explained to the patient. The patient gave oral consent to proceed. The flexible scope was inserted into the right nasal cavity and advanced towards the nasopharynx. Visualized mucosa over the turbinates and septum were as described above. The nasopharynx was clear. Oropharyngeal walls were symmetric and mobile without lesion, mass, or edema. Hypopharynx was also without lesion or edema. Larynx was mobile without lesions. Supraglottic structures were free of edema, mass, and asymmetry. True vocal folds were white without mass or lesion. Base of tongue was within normal limits. -laryngeal edema -contact ulcers on both arytenoids -crowded airway -no pooling -no obvious regurgitation

## 2024-04-09 NOTE — HISTORY OF PRESENT ILLNESS
[de-identified] : Patient with h/o GERD, ADAIR on auto-pap with nasal pillows, recurrent hiccups, and chronic rhinitis presents today stating that he has had recurrent hiccups/belching for the past 4-5 months. He states that it feels like he has air pockets in his throat. He states that he has been using Gaviscon and Chlorpromazine, which has given him some improvement.

## 2024-04-09 NOTE — ADDENDUM
[FreeTextEntry1] :  Documented by Rufus Morrell acting as scribe for Dr. Boyd on 04/09/2024. All Medical record entries made by the Scribe were at my, Dr. Boyd, direction and personally dictated by me on 04/09/2024 . I have reviewed the chart and agree that the record accurately reflects my personal performance of the history, physical exam, assessment and plan. I have also personally directed, reviewed, and agreed with the discharge instructions.

## 2024-04-09 NOTE — PHYSICAL EXAM
[] : septum deviated bilaterally [Midline] : trachea located in midline position [Normal] : orientation to person, place, and time: normal [Hearing Loss Right Only] : normal [Hearing Loss Left Only] : normal [FreeTextEntry8] :  cerumen removed via curettage. cerumen removal did not trigger Arnold's nerve [FreeTextEntry9] :  cerumen impaction removed via curettage. cerumen removal did not trigger Arnold's nerve [de-identified] :  mildly inflamed turbinates bilaterally [de-identified] : class 2 [de-identified] : large, floppy uvula. type 3 oral cavity [FreeTextEntry2] :  sinuses nontender to percussion.  sensations intact

## 2024-04-15 ENCOUNTER — APPOINTMENT (OUTPATIENT)
Dept: CARDIOLOGY | Facility: CLINIC | Age: 59
End: 2024-04-15
Payer: COMMERCIAL

## 2024-04-15 ENCOUNTER — NON-APPOINTMENT (OUTPATIENT)
Age: 59
End: 2024-04-15

## 2024-04-15 VITALS — DIASTOLIC BLOOD PRESSURE: 90 MMHG | SYSTOLIC BLOOD PRESSURE: 150 MMHG

## 2024-04-15 VITALS
OXYGEN SATURATION: 98 % | HEART RATE: 62 BPM | DIASTOLIC BLOOD PRESSURE: 96 MMHG | HEIGHT: 71 IN | SYSTOLIC BLOOD PRESSURE: 183 MMHG | BODY MASS INDEX: 34.44 KG/M2 | WEIGHT: 246 LBS

## 2024-04-15 DIAGNOSIS — I10 ESSENTIAL (PRIMARY) HYPERTENSION: ICD-10-CM

## 2024-04-15 PROCEDURE — 99214 OFFICE O/P EST MOD 30 MIN: CPT

## 2024-04-15 PROCEDURE — G2211 COMPLEX E/M VISIT ADD ON: CPT

## 2024-04-15 PROCEDURE — 93000 ELECTROCARDIOGRAM COMPLETE: CPT

## 2024-04-15 NOTE — DISCUSSION/SUMMARY
[FreeTextEntry1] : 58 year old male bisexual patient, hypertension, steroid and HGH abuse, now off of these agents, a  prior moderate nonischemic cardiomyopathy with an EF of 40%, now with only low normal LV function, with an EF of 50-55%.  There are no signs of acute volume overload.  He had a Holter had had 8% PVCs and multiple episodes of NSVT.   He will continue Toprol 200QD.  He is going to continue losartan 100 QD>   His BP is high here.  He is going to buy a BP cuff and start monitoring at home.  He will email me the results.  I am repeating blood work as he is a borderline diabetic.  Assuming all is normal, he will follow in six months.  [EKG obtained to assist in diagnosis and management of assessed problem(s)] : EKG obtained to assist in diagnosis and management of assessed problem(s)

## 2024-04-15 NOTE — PHYSICAL EXAM
[Normal Appearance] : normal appearance [General Appearance - In No Acute Distress] : no acute distress [Normal Conjunctiva] : the conjunctiva exhibited no abnormalities [Normal Oral Mucosa] : normal oral mucosa [Normal Jugular Venous V Waves Present] : normal jugular venous V waves present [FreeTextEntry1] : No JVD [Respiration, Rhythm And Depth] : normal respiratory rhythm and effort [Exaggerated Use Of Accessory Muscles For Inspiration] : no accessory muscle use [Auscultation Breath Sounds / Voice Sounds] : lungs were clear to auscultation bilaterally [Abdomen Soft] : soft [Bowel Sounds] : normal bowel sounds [Abdomen Tenderness] : non-tender [Abnormal Walk] : normal gait [Gait - Sufficient For Exercise Testing] : the gait was sufficient for exercise testing [Cyanosis, Localized] : no localized cyanosis [Nail Clubbing] : no clubbing of the fingernails [Petechial Hemorrhages (___cm)] : no petechial hemorrhages [Skin Color & Pigmentation] : normal skin color and pigmentation [] : no rash [No Venous Stasis] : no venous stasis [Skin Lesions] : no skin lesions [Oriented To Time, Place, And Person] : oriented to person, place, and time [Affect] : the affect was normal [Mood] : the mood was normal [No Anxiety] : not feeling anxious [Not Palpable] : not palpable [No Precordial Heave] : no precordial heave was noted [Normal Rate] : normal [Rhythm Regular] : regular [Normal S1] : normal S1 [Normal S2] : normal S2 [No Murmur] : no murmurs heard [Right Carotid Bruit] : no bruit heard over the right carotid [Left Carotid Bruit] : no bruit heard over the left carotid [1+] : left 1+ [No Abnormalities] : the abdominal aorta was not enlarged and no bruit was heard [Bruit] : no bruit heard [No Pitting Edema] : no pitting edema present

## 2024-04-15 NOTE — CARDIOLOGY SUMMARY
[de-identified] : NSR.  Old ASMI.  Nonspecific T wave changes [___] : [unfilled] [LVEF ___%] : LVEF [unfilled]% [Moderate] : moderate LV dysfunction

## 2024-04-15 NOTE — HISTORY OF PRESENT ILLNESS
[FreeTextEntry1] : This is a 58 year old man who presents to the office for a follow up visit.  He has a history of hypertension.   He had been using steroids and human growth hormone for years, but has since stopped.  I sent him for an echocardiogram, and this showed moderate segmental LV dysfunction with an EF of 40% and inferior, apical, and lateral hypokinesis.  He was referred for cath, and no significant obstructive CAD was found.  I placed a Holter, and he had PVCs that comprised 8% of his total beats.  He had multiple episodes of NSVT, along with one episode of AIVR.   He had a repeat echocardiogram, and now has low normal LV function,  with an EF of 50-55%.  He has no other symptoms, denying chest pain, increased dyspnea, PND, orthopnea, LE swelling, dizziness, or syncope.   He is getting testosterone replacement therapy, as his testosterone is low.  He feels well. His pressure is high here, but he reports controlled at outside office settings.  His BP has been it the prediabetes range.

## 2024-04-17 LAB
ALBUMIN SERPL ELPH-MCNC: 4.4 G/DL
ALP BLD-CCNC: 62 U/L
ALT SERPL-CCNC: 37 U/L
ANION GAP SERPL CALC-SCNC: 12 MMOL/L
AST SERPL-CCNC: 30 U/L
BILIRUB SERPL-MCNC: 0.8 MG/DL
BUN SERPL-MCNC: 15 MG/DL
CALCIUM SERPL-MCNC: 9.1 MG/DL
CHLORIDE SERPL-SCNC: 100 MMOL/L
CHOLEST SERPL-MCNC: 177 MG/DL
CO2 SERPL-SCNC: 28 MMOL/L
CREAT SERPL-MCNC: 1.13 MG/DL
EGFR: 76 ML/MIN/1.73M2
ESTIMATED AVERAGE GLUCOSE: 111 MG/DL
GLUCOSE SERPL-MCNC: 118 MG/DL
HBA1C MFR BLD HPLC: 5.5 %
HCT VFR BLD CALC: 48.4 %
HDLC SERPL-MCNC: 25 MG/DL
HGB BLD-MCNC: 15 G/DL
LDLC SERPL CALC-MCNC: 125 MG/DL
MCHC RBC-ENTMCNC: 28 PG
MCHC RBC-ENTMCNC: 31 GM/DL
MCV RBC AUTO: 90.5 FL
NONHDLC SERPL-MCNC: 152 MG/DL
PLATELET # BLD AUTO: 294 K/UL
POTASSIUM SERPL-SCNC: 4.7 MMOL/L
PROT SERPL-MCNC: 6.7 G/DL
RBC # BLD: 5.35 M/UL
RBC # FLD: 13.8 %
SODIUM SERPL-SCNC: 141 MMOL/L
TESTOST FREE SERPL-MCNC: 15.5 PG/ML
TESTOST SERPL-MCNC: 1108 NG/DL
TRIGL SERPL-MCNC: 149 MG/DL
WBC # FLD AUTO: 6.37 K/UL

## 2024-05-03 ENCOUNTER — APPOINTMENT (OUTPATIENT)
Dept: INFECTIOUS DISEASE | Facility: CLINIC | Age: 59
End: 2024-05-03

## 2024-05-03 ENCOUNTER — NON-APPOINTMENT (OUTPATIENT)
Age: 59
End: 2024-05-03

## 2024-05-03 ENCOUNTER — RESULT REVIEW (OUTPATIENT)
Age: 59
End: 2024-05-03

## 2024-05-03 ENCOUNTER — OUTPATIENT (OUTPATIENT)
Dept: OUTPATIENT SERVICES | Facility: HOSPITAL | Age: 59
LOS: 1 days | End: 2024-05-03
Payer: COMMERCIAL

## 2024-05-03 DIAGNOSIS — J38.4 EDEMA OF LARYNX: ICD-10-CM

## 2024-05-03 PROCEDURE — 74230 X-RAY XM SWLNG FUNCJ C+: CPT | Mod: 26

## 2024-05-03 PROCEDURE — A9698: CPT

## 2024-05-03 PROCEDURE — 74230 X-RAY XM SWLNG FUNCJ C+: CPT

## 2024-05-03 PROCEDURE — 74240 X-RAY XM UPR GI TRC 1CNTRST: CPT

## 2024-05-03 PROCEDURE — 92611 MOTION FLUOROSCOPY/SWALLOW: CPT

## 2024-05-03 PROCEDURE — 74240 X-RAY XM UPR GI TRC 1CNTRST: CPT | Mod: 26

## 2024-05-23 ENCOUNTER — NON-APPOINTMENT (OUTPATIENT)
Age: 59
End: 2024-05-23

## 2024-05-24 ENCOUNTER — APPOINTMENT (OUTPATIENT)
Dept: OTOLARYNGOLOGY | Facility: CLINIC | Age: 59
End: 2024-05-24
Payer: COMMERCIAL

## 2024-05-24 VITALS
HEART RATE: 73 BPM | HEIGHT: 71 IN | WEIGHT: 247 LBS | SYSTOLIC BLOOD PRESSURE: 160 MMHG | DIASTOLIC BLOOD PRESSURE: 83 MMHG | BODY MASS INDEX: 34.58 KG/M2

## 2024-05-24 DIAGNOSIS — G47.33 OBSTRUCTIVE SLEEP APNEA (ADULT) (PEDIATRIC): ICD-10-CM

## 2024-05-24 DIAGNOSIS — R13.12 DYSPHAGIA, OROPHARYNGEAL PHASE: ICD-10-CM

## 2024-05-24 DIAGNOSIS — K21.9 GASTRO-ESOPHAGEAL REFLUX DISEASE W/OUT ESOPHAGITIS: ICD-10-CM

## 2024-05-24 DIAGNOSIS — R06.6 HICCOUGH: ICD-10-CM

## 2024-05-24 PROCEDURE — 99214 OFFICE O/P EST MOD 30 MIN: CPT

## 2024-05-24 RX ORDER — OMEPRAZOLE 40 MG/1
40 CAPSULE, DELAYED RELEASE ORAL
Qty: 90 | Refills: 3 | Status: ACTIVE | COMMUNITY
Start: 2024-05-24 | End: 1900-01-01

## 2024-05-24 RX ORDER — METOCLOPRAMIDE 5 MG/1
5 TABLET ORAL 3 TIMES DAILY
Qty: 360 | Refills: 0 | Status: ACTIVE | COMMUNITY
Start: 2024-04-09 | End: 1900-01-01

## 2024-05-24 NOTE — DATA REVIEWED
[de-identified] : Compliance report 2/22/24-5/21/24: -averages 3 hours per night - not compliant -AHI 13.9 -machine set from 5-20, but no pressure needed over 12.6 -a lot of leakage from the mask  Compliance report 2/5/23: -not compliant -AHI 1.6  Compliance report January 2023: -AHI 10.4 [de-identified] : MBS 5/3/24: -penetration of both the vocal cords with thin liquids -recommend: GI consult. Stay upright during and 30-45 minutes after eating/drinking  Upper GI endoscopy 5/3/24: -small hiatal hernia -no significant reflux -normal appearance of the stomachs -no defects

## 2024-05-24 NOTE — ADDENDUM
[FreeTextEntry1] :  Documented by Rufus Morrell acting as scribe for Dr. Boyd on 05/24/2024. All Medical record entries made by the Scribe were at my, Dr. Boyd, direction and personally dictated by me on 05/24/2024 . I have reviewed the chart and agree that the record accurately reflects my personal performance of the history, physical exam, assessment and plan. I have also personally directed, reviewed, and agreed with the discharge instructions.

## 2024-05-24 NOTE — ASSESSMENT
[FreeTextEntry1] : Reviewed and reconciled medications, allergies, PMHx, PSHx, SocHx, FMHx.   Patient with h/o GERD, ADAIR on auto-pap with nasal pillows, chronic hiccups, and chronic rhinitis presents today to discuss results from MBS. He states that he still has belching and recurrent hiccups. He denies coughing when he drinks. He states that he uses his CPAP machine, but he has to take it off when the belching/hiccups start. He denies using Omeprazole, but he states that he takes Famotidine at night. He states that his hiccups have improved with medication.  MBS 5/3/24: -penetration of both the vocal cords with thin liquids -recommend: GI consult. Stay upright during and 30-45 minutes after eating/drinking  Upper GI endoscopy 5/3/24: -small hiatal hernia -no significant reflux -normal appearance of the stomachs -no defects  Compliance report 2/22/24-5/21/24: -averages 3 hours per night - not compliant -AHI 13.9 -machine set from 5-20, but no pressure needed over 12.6 -a lot of leakage from the mask  Compliance report 2/5/23: -not compliant -AHI 1.6  Compliance report January 2023: -AHI 10.4   Plan: -Start Omeprazole QAM 30 minutes after other medications and 30 minutes before morning meal -Continue Famotidine at night -Continue Metoclopramide -See GI for an endoscopy -See pulmonology about CPAP -Greater than 50% of the interaction spent discussing results and treatment -FU with results from endoscopy

## 2024-05-24 NOTE — HISTORY OF PRESENT ILLNESS
[de-identified] :  Patient with h/o GERD, ADAIR on auto-pap with nasal pillows, chronic hiccups, and chronic rhinitis presents today to discuss results from MBS. He states that he still has belching and recurrent hiccups. He denies coughing when he drinks. He states that he uses his CPAP machine, but he has to take it off when the belching/hiccups start. He denies using Omeprazole, but he states that he takes Famotidine at night. He states that his hiccups have improved with medication.

## 2024-05-28 ENCOUNTER — RX RENEWAL (OUTPATIENT)
Age: 59
End: 2024-05-28

## 2024-05-28 RX ORDER — EMTRICITABINE AND TENOFOVIR ALAFENAMIDE 200; 25 MG/1; MG/1
200-25 TABLET ORAL
Qty: 30 | Refills: 5 | Status: ACTIVE | COMMUNITY
Start: 2023-03-13 | End: 1900-01-01

## 2024-06-03 ENCOUNTER — RX RENEWAL (OUTPATIENT)
Age: 59
End: 2024-06-03

## 2024-08-19 ENCOUNTER — RX RENEWAL (OUTPATIENT)
Age: 59
End: 2024-08-19

## 2024-09-06 ENCOUNTER — RX RENEWAL (OUTPATIENT)
Age: 59
End: 2024-09-06

## 2024-10-15 ENCOUNTER — NON-APPOINTMENT (OUTPATIENT)
Age: 59
End: 2024-10-15

## 2024-10-15 ENCOUNTER — APPOINTMENT (OUTPATIENT)
Dept: CARDIOLOGY | Facility: CLINIC | Age: 59
End: 2024-10-15
Payer: COMMERCIAL

## 2024-10-15 VITALS
WEIGHT: 239 LBS | BODY MASS INDEX: 33.46 KG/M2 | HEIGHT: 71 IN | HEART RATE: 59 BPM | OXYGEN SATURATION: 98 % | SYSTOLIC BLOOD PRESSURE: 184 MMHG | DIASTOLIC BLOOD PRESSURE: 89 MMHG

## 2024-10-15 VITALS — DIASTOLIC BLOOD PRESSURE: 82 MMHG | SYSTOLIC BLOOD PRESSURE: 160 MMHG

## 2024-10-15 DIAGNOSIS — E78.5 HYPERLIPIDEMIA, UNSPECIFIED: ICD-10-CM

## 2024-10-15 PROCEDURE — 93000 ELECTROCARDIOGRAM COMPLETE: CPT

## 2024-10-15 PROCEDURE — 99214 OFFICE O/P EST MOD 30 MIN: CPT

## 2024-10-15 PROCEDURE — G2211 COMPLEX E/M VISIT ADD ON: CPT | Mod: NC

## 2024-10-15 RX ORDER — ATORVASTATIN CALCIUM 20 MG/1
20 TABLET, FILM COATED ORAL
Qty: 30 | Refills: 1 | Status: ACTIVE | COMMUNITY
Start: 2024-10-15

## 2024-11-12 ENCOUNTER — APPOINTMENT (OUTPATIENT)
Dept: INFECTIOUS DISEASE | Facility: CLINIC | Age: 59
End: 2024-11-12
Payer: COMMERCIAL

## 2024-11-12 VITALS
HEART RATE: 64 BPM | TEMPERATURE: 97.3 F | OXYGEN SATURATION: 97 % | BODY MASS INDEX: 34.16 KG/M2 | WEIGHT: 244 LBS | SYSTOLIC BLOOD PRESSURE: 144 MMHG | HEIGHT: 71 IN | DIASTOLIC BLOOD PRESSURE: 72 MMHG

## 2024-11-12 DIAGNOSIS — Z20.9 CONTACT WITH AND (SUSPECTED) EXPOSURE TO UNSPECIFIED COMMUNICABLE DISEASE: ICD-10-CM

## 2024-11-12 PROCEDURE — 99214 OFFICE O/P EST MOD 30 MIN: CPT

## 2024-11-14 LAB
25(OH)D3 SERPL-MCNC: 40.5 NG/ML
ALBUMIN SERPL ELPH-MCNC: 4.2 G/DL
ALP BLD-CCNC: 72 U/L
ALT SERPL-CCNC: 26 U/L
ANAL PAP CYTOLOGY: NORMAL
ANION GAP SERPL CALC-SCNC: 13 MMOL/L
APPEARANCE: CLEAR
AST SERPL-CCNC: 25 U/L
BACTERIA: NEGATIVE /HPF
BILIRUB SERPL-MCNC: 0.4 MG/DL
BILIRUBIN URINE: NEGATIVE
BLOOD URINE: NEGATIVE
BUN SERPL-MCNC: 21 MG/DL
C TRACH RRNA SPEC QL NAA+PROBE: NOT DETECTED
CALCIUM SERPL-MCNC: 9.1 MG/DL
CAST: 0 /LPF
CHLORIDE SERPL-SCNC: 102 MMOL/L
CHOLEST SERPL-MCNC: 115 MG/DL
CO2 SERPL-SCNC: 25 MMOL/L
COLOR: YELLOW
CREAT SERPL-MCNC: 1.25 MG/DL
EGFR: 67 ML/MIN/1.73M2
EPITHELIAL CELLS: 0 /HPF
ESTIMATED AVERAGE GLUCOSE: 117 MG/DL
GLUCOSE QUALITATIVE U: NEGATIVE MG/DL
GLUCOSE SERPL-MCNC: 135 MG/DL
HBA1C MFR BLD HPLC: 5.7 %
HBV SURFACE AG SER QL: NONREACTIVE
HCT VFR BLD CALC: 41.7 %
HCV AB SER QL: NONREACTIVE
HCV S/CO RATIO: 0.08 S/CO
HDLC SERPL-MCNC: 25 MG/DL
HGB BLD-MCNC: 12.4 G/DL
HIV1+2 AB SPEC QL IA.RAPID: NONREACTIVE
KETONES URINE: NEGATIVE MG/DL
LDLC SERPL CALC-MCNC: 62 MG/DL
LEUKOCYTE ESTERASE URINE: NEGATIVE
MCHC RBC-ENTMCNC: 26.1 PG
MCHC RBC-ENTMCNC: 29.7 G/DL
MCV RBC AUTO: 87.8 FL
MICROSCOPIC-UA: NORMAL
N GONORRHOEA RRNA SPEC QL NAA+PROBE: NOT DETECTED
NITRITE URINE: NEGATIVE
NONHDLC SERPL-MCNC: 90 MG/DL
PH URINE: 6
PLATELET # BLD AUTO: 222 K/UL
POTASSIUM SERPL-SCNC: 4.3 MMOL/L
PROT SERPL-MCNC: 6.6 G/DL
PROTEIN URINE: 30 MG/DL
PSA SERPL-MCNC: 0.69 NG/ML
RBC # BLD: 4.75 M/UL
RBC # FLD: 14.8 %
RED BLOOD CELLS URINE: 2 /HPF
SODIUM SERPL-SCNC: 141 MMOL/L
SOURCE AMPLIFICATION: NORMAL
SOURCE ANAL: NORMAL
SOURCE ORAL: NORMAL
SPECIFIC GRAVITY URINE: 1.03
T PALLIDUM AB SER QL IA: NEGATIVE
TRIGL SERPL-MCNC: 163 MG/DL
UROBILINOGEN URINE: 0.2 MG/DL
WBC # FLD AUTO: 7.38 K/UL
WHITE BLOOD CELLS URINE: 1 /HPF

## 2024-11-17 LAB — HPV DNA, HIGH RISK, LOW RISK, ANAL: NOT DETECTED

## 2024-11-26 DIAGNOSIS — Z79.899 OTHER LONG TERM (CURRENT) DRUG THERAPY: ICD-10-CM

## 2025-01-01 ENCOUNTER — RX RENEWAL (OUTPATIENT)
Age: 60
End: 2025-01-01

## 2025-02-14 ENCOUNTER — APPOINTMENT (OUTPATIENT)
Dept: INFECTIOUS DISEASE | Facility: CLINIC | Age: 60
End: 2025-02-14

## 2025-02-28 ENCOUNTER — RX RENEWAL (OUTPATIENT)
Age: 60
End: 2025-02-28

## 2025-04-10 ENCOUNTER — NON-APPOINTMENT (OUTPATIENT)
Age: 60
End: 2025-04-10

## 2025-04-10 ENCOUNTER — APPOINTMENT (OUTPATIENT)
Dept: CARDIOLOGY | Facility: CLINIC | Age: 60
End: 2025-04-10
Payer: COMMERCIAL

## 2025-04-10 VITALS
DIASTOLIC BLOOD PRESSURE: 79 MMHG | HEART RATE: 65 BPM | WEIGHT: 254 LBS | OXYGEN SATURATION: 97 % | BODY MASS INDEX: 35.56 KG/M2 | HEIGHT: 71 IN | SYSTOLIC BLOOD PRESSURE: 177 MMHG

## 2025-04-10 VITALS — SYSTOLIC BLOOD PRESSURE: 150 MMHG | DIASTOLIC BLOOD PRESSURE: 70 MMHG

## 2025-04-10 DIAGNOSIS — I10 ESSENTIAL (PRIMARY) HYPERTENSION: ICD-10-CM

## 2025-04-10 PROCEDURE — 93000 ELECTROCARDIOGRAM COMPLETE: CPT

## 2025-04-10 PROCEDURE — 99214 OFFICE O/P EST MOD 30 MIN: CPT | Mod: 25

## 2025-04-16 ENCOUNTER — RX RENEWAL (OUTPATIENT)
Age: 60
End: 2025-04-16

## 2025-05-07 ENCOUNTER — APPOINTMENT (OUTPATIENT)
Dept: INFECTIOUS DISEASE | Facility: CLINIC | Age: 60
End: 2025-05-07
Payer: COMMERCIAL

## 2025-05-07 VITALS
BODY MASS INDEX: 35.42 KG/M2 | OXYGEN SATURATION: 99 % | HEIGHT: 71 IN | SYSTOLIC BLOOD PRESSURE: 129 MMHG | HEART RATE: 102 BPM | TEMPERATURE: 98.5 F | DIASTOLIC BLOOD PRESSURE: 80 MMHG | WEIGHT: 253 LBS

## 2025-05-07 DIAGNOSIS — Z79.2 LONG TERM (CURRENT) USE OF ANTIBIOTICS: ICD-10-CM

## 2025-05-07 DIAGNOSIS — Z79.899 OTHER LONG TERM (CURRENT) DRUG THERAPY: ICD-10-CM

## 2025-05-07 PROCEDURE — 99214 OFFICE O/P EST MOD 30 MIN: CPT

## 2025-05-07 RX ORDER — DOXYCYCLINE HYCLATE 100 MG/1
100 CAPSULE ORAL
Qty: 40 | Refills: 5 | Status: ACTIVE | COMMUNITY
Start: 2025-05-07 | End: 1900-01-01

## 2025-05-09 LAB
25(OH)D3 SERPL-MCNC: 23.3 NG/ML
ALBUMIN SERPL ELPH-MCNC: 4.5 G/DL
ALP BLD-CCNC: 74 U/L
ALT SERPL-CCNC: 46 U/L
ANION GAP SERPL CALC-SCNC: 16 MMOL/L
APPEARANCE: CLEAR
AST SERPL-CCNC: 43 U/L
BACTERIA: NEGATIVE /HPF
BILIRUB SERPL-MCNC: 0.7 MG/DL
BILIRUBIN URINE: ABNORMAL
BLOOD URINE: NEGATIVE
BUN SERPL-MCNC: 12 MG/DL
C TRACH RRNA SPEC QL NAA+PROBE: NOT DETECTED
CALCIUM OXALATE CRYSTALS: PRESENT
CALCIUM SERPL-MCNC: 9.5 MG/DL
CAST: 5 /LPF
CD3 CELLS # BLD: 1332 CELLS/UL
CD3 CELLS NFR BLD: 72 %
CD3+CD4+ CELLS # BLD: 815 CELLS/UL
CD3+CD4+ CELLS NFR BLD: 44 %
CD3+CD4+ CELLS/CD3+CD8+ CLL SPEC: 1.74 RATIO
CD3+CD8+ CELLS # SPEC: 469 CELLS/UL
CD3+CD8+ CELLS NFR BLD: 25 %
CHLORIDE SERPL-SCNC: 100 MMOL/L
CHOLEST SERPL-MCNC: 157 MG/DL
CO2 SERPL-SCNC: 22 MMOL/L
COLOR: NORMAL
CREAT SERPL-MCNC: 1.3 MG/DL
EGFRCR SERPLBLD CKD-EPI 2021: 63 ML/MIN/1.73M2
EPITHELIAL CELLS: 1 /HPF
GLUCOSE QUALITATIVE U: 100 MG/DL
GLUCOSE SERPL-MCNC: 167 MG/DL
HBV CORE IGG+IGM SER QL: NONREACTIVE
HBV SURFACE AB SER QL: NONREACTIVE
HBV SURFACE AG SER QL: NONREACTIVE
HCT VFR BLD CALC: 41.4 %
HCV AB SER QL: NONREACTIVE
HCV S/CO RATIO: 0.12 S/CO
HDLC SERPL-MCNC: 5 MG/DL
HGB BLD-MCNC: 11.5 G/DL
HIV1 RNA # SERPL NAA+PROBE: NORMAL
HIV1 RNA # SERPL NAA+PROBE: NORMAL COPIES/ML
HIV1+2 AB SPEC QL IA.RAPID: NONREACTIVE
HYALINE CASTS: PRESENT
KETONES URINE: ABNORMAL MG/DL
LDLC SERPL-MCNC: 110 MG/DL
LEUKOCYTE ESTERASE URINE: ABNORMAL
MCHC RBC-ENTMCNC: 21.1 PG
MCHC RBC-ENTMCNC: 27.8 G/DL
MCV RBC AUTO: 76 FL
MICROSCOPIC-UA: NORMAL
N GONORRHOEA RRNA SPEC QL NAA+PROBE: NOT DETECTED
NITRITE URINE: NEGATIVE
NONHDLC SERPL-MCNC: 151 MG/DL
PH URINE: 6.5
PLATELET # BLD AUTO: 444 K/UL
POTASSIUM SERPL-SCNC: 4.5 MMOL/L
PROT SERPL-MCNC: 6.9 G/DL
PROTEIN URINE: 100 MG/DL
PSA SERPL-MCNC: 0.69 NG/ML
RBC # BLD: 5.45 M/UL
RBC # FLD: 21 %
RED BLOOD CELLS URINE: 9 /HPF
REVIEW: NORMAL
SODIUM SERPL-SCNC: 138 MMOL/L
SOURCE AMPLIFICATION: NORMAL
SOURCE ANAL: NORMAL
SOURCE ORAL: NORMAL
SPECIFIC GRAVITY URINE: >1.03
T PALLIDUM AB SER QL IA: NEGATIVE
TRIGL SERPL-MCNC: 233 MG/DL
UROBILINOGEN URINE: 1 MG/DL
VIABILITY: NORMAL
VIRAL LOAD INTERP: NORMAL
VIRAL LOAD LOG: NORMAL LG COP/ML
WBC # FLD AUTO: 9.24 K/UL
WHITE BLOOD CELLS URINE: 0 /HPF

## 2025-05-09 RX ORDER — CABOTEGRAVIR 600 MG/3ML
600 KIT INTRAMUSCULAR
Qty: 1 | Refills: 5 | Status: ACTIVE | COMMUNITY
Start: 2025-05-08

## 2025-05-09 RX ORDER — CABOTEGRAVIR 600 MG/3ML
600 KIT INTRAMUSCULAR
Qty: 1 | Refills: 1 | Status: ACTIVE | COMMUNITY
Start: 2025-05-08

## 2025-05-12 LAB
TESTOST FREE SERPL-MCNC: 52.9 PG/ML
TESTOST SERPL-MCNC: 1416 NG/DL

## 2025-05-20 ENCOUNTER — APPOINTMENT (OUTPATIENT)
Dept: OTOLARYNGOLOGY | Facility: CLINIC | Age: 60
End: 2025-05-20
Payer: COMMERCIAL

## 2025-05-20 DIAGNOSIS — R06.6 HICCOUGH: ICD-10-CM

## 2025-05-20 DIAGNOSIS — R13.12 DYSPHAGIA, OROPHARYNGEAL PHASE: ICD-10-CM

## 2025-05-20 DIAGNOSIS — H61.22 IMPACTED CERUMEN, LEFT EAR: ICD-10-CM

## 2025-05-20 DIAGNOSIS — J31.0 CHRONIC RHINITIS: ICD-10-CM

## 2025-05-20 DIAGNOSIS — J38.7 OTHER DISEASES OF LARYNX: ICD-10-CM

## 2025-05-20 DIAGNOSIS — R14.2 ERUCTATION: ICD-10-CM

## 2025-05-20 DIAGNOSIS — J34.2 DEVIATED NASAL SEPTUM: ICD-10-CM

## 2025-05-20 DIAGNOSIS — K21.9 GASTRO-ESOPHAGEAL REFLUX DISEASE W/OUT ESOPHAGITIS: ICD-10-CM

## 2025-05-20 PROCEDURE — 31575 DIAGNOSTIC LARYNGOSCOPY: CPT

## 2025-05-20 PROCEDURE — 99214 OFFICE O/P EST MOD 30 MIN: CPT | Mod: 25

## 2025-06-03 ENCOUNTER — RX RENEWAL (OUTPATIENT)
Age: 60
End: 2025-06-03

## 2025-06-05 NOTE — ED PROVIDER NOTE - CONSTITUTIONAL NEGATIVE STATEMENT, MLM
What Is The Reason For Today's Visit?: Full Body Skin Examination
What Is The Reason For Today's Visit? (Being Monitored For X): concerning skin lesions on an annual basis
no fever and no chills.

## 2025-06-12 ENCOUNTER — APPOINTMENT (OUTPATIENT)
Dept: CARDIOLOGY | Facility: CLINIC | Age: 60
End: 2025-06-12
Payer: COMMERCIAL

## 2025-06-12 VITALS
OXYGEN SATURATION: 95 % | HEART RATE: 67 BPM | DIASTOLIC BLOOD PRESSURE: 79 MMHG | BODY MASS INDEX: 35.14 KG/M2 | WEIGHT: 251 LBS | HEIGHT: 71 IN | SYSTOLIC BLOOD PRESSURE: 178 MMHG

## 2025-06-12 VITALS — DIASTOLIC BLOOD PRESSURE: 80 MMHG | SYSTOLIC BLOOD PRESSURE: 160 MMHG

## 2025-06-12 PROCEDURE — 99214 OFFICE O/P EST MOD 30 MIN: CPT | Mod: 25

## 2025-06-12 PROCEDURE — 93000 ELECTROCARDIOGRAM COMPLETE: CPT

## 2025-06-13 LAB
ANION GAP SERPL CALC-SCNC: 13 MMOL/L
APTT BLD: 22.7 SEC
BUN SERPL-MCNC: 12 MG/DL
CALCIUM SERPL-MCNC: 9.3 MG/DL
CHLORIDE SERPL-SCNC: 98 MMOL/L
CO2 SERPL-SCNC: 27 MMOL/L
CREAT SERPL-MCNC: 1.23 MG/DL
EGFRCR SERPLBLD CKD-EPI 2021: 68 ML/MIN/1.73M2
GLUCOSE SERPL-MCNC: 128 MG/DL
HCT VFR BLD CALC: 41.8 %
HGB BLD-MCNC: 11.6 G/DL
INR PPP: 1.09 RATIO
MCHC RBC-ENTMCNC: 21.7 PG
MCHC RBC-ENTMCNC: 27.8 G/DL
MCV RBC AUTO: 78.3 FL
PLATELET # BLD AUTO: 311 K/UL
POTASSIUM SERPL-SCNC: 4.6 MMOL/L
PT BLD: 12.9 SEC
RBC # BLD: 5.34 M/UL
RBC # FLD: 22.3 %
SODIUM SERPL-SCNC: 138 MMOL/L
WBC # FLD AUTO: 7.67 K/UL

## 2025-06-17 ENCOUNTER — APPOINTMENT (OUTPATIENT)
Dept: OTOLARYNGOLOGY | Facility: AMBULATORY MEDICAL SERVICES | Age: 60
End: 2025-06-17

## 2025-06-17 ENCOUNTER — RESULT REVIEW (OUTPATIENT)
Age: 60
End: 2025-06-17

## 2025-06-17 RX ORDER — METHYLPREDNISOLONE 4 MG/1
4 TABLET ORAL
Qty: 1 | Refills: 0 | Status: ACTIVE | COMMUNITY
Start: 2025-06-17 | End: 1900-01-01

## 2025-06-17 RX ORDER — AMOXICILLIN 250 MG/5ML
250 POWDER, FOR SUSPENSION ORAL 3 TIMES DAILY
Qty: 2 | Refills: 0 | Status: ACTIVE | COMMUNITY
Start: 2025-06-17 | End: 1900-01-01

## 2025-06-25 ENCOUNTER — APPOINTMENT (OUTPATIENT)
Dept: OTOLARYNGOLOGY | Facility: CLINIC | Age: 60
End: 2025-06-25
Payer: COMMERCIAL

## 2025-06-25 VITALS
BODY MASS INDEX: 34.66 KG/M2 | HEIGHT: 71 IN | DIASTOLIC BLOOD PRESSURE: 83 MMHG | HEART RATE: 61 BPM | SYSTOLIC BLOOD PRESSURE: 162 MMHG | WEIGHT: 247.6 LBS

## 2025-06-25 PROCEDURE — 99212 OFFICE O/P EST SF 10 MIN: CPT | Mod: 25

## 2025-06-30 ENCOUNTER — NON-APPOINTMENT (OUTPATIENT)
Age: 60
End: 2025-06-30

## 2025-07-14 ENCOUNTER — APPOINTMENT (OUTPATIENT)
Dept: OTOLARYNGOLOGY | Facility: CLINIC | Age: 60
End: 2025-07-14
Payer: COMMERCIAL

## 2025-07-14 VITALS
WEIGHT: 250 LBS | BODY MASS INDEX: 35 KG/M2 | DIASTOLIC BLOOD PRESSURE: 77 MMHG | HEIGHT: 71 IN | SYSTOLIC BLOOD PRESSURE: 168 MMHG | HEART RATE: 60 BPM

## 2025-07-14 PROCEDURE — 99213 OFFICE O/P EST LOW 20 MIN: CPT
